# Patient Record
Sex: FEMALE | Race: WHITE | NOT HISPANIC OR LATINO | Employment: FULL TIME | URBAN - METROPOLITAN AREA
[De-identification: names, ages, dates, MRNs, and addresses within clinical notes are randomized per-mention and may not be internally consistent; named-entity substitution may affect disease eponyms.]

---

## 2017-09-06 ENCOUNTER — GENERIC CONVERSION - ENCOUNTER (OUTPATIENT)
Dept: OTHER | Facility: OTHER | Age: 47
End: 2017-09-06

## 2017-09-06 ENCOUNTER — GENERIC CONVERSION - ENCOUNTER (OUTPATIENT)
Dept: FAMILY MEDICINE CLINIC | Facility: CLINIC | Age: 47
End: 2017-09-06

## 2017-10-20 DIAGNOSIS — E03.9 HYPOTHYROIDISM: ICD-10-CM

## 2017-12-08 DIAGNOSIS — E03.9 HYPOTHYROIDISM: ICD-10-CM

## 2018-01-22 VITALS
SYSTOLIC BLOOD PRESSURE: 118 MMHG | BODY MASS INDEX: 34.55 KG/M2 | DIASTOLIC BLOOD PRESSURE: 70 MMHG | HEIGHT: 61 IN | TEMPERATURE: 98.8 F | WEIGHT: 183 LBS

## 2018-01-22 VITALS — RESPIRATION RATE: 16 BRPM | HEART RATE: 64 BPM

## 2018-06-11 DIAGNOSIS — E03.9 HYPOTHYROIDISM, UNSPECIFIED TYPE: Primary | ICD-10-CM

## 2018-06-11 RX ORDER — LEVOTHYROXINE SODIUM 0.03 MG/1
1 TABLET ORAL DAILY
COMMUNITY
Start: 2017-09-06 | End: 2018-06-11 | Stop reason: SDUPTHER

## 2018-06-12 PROBLEM — E03.9 HYPOTHYROID: Status: ACTIVE | Noted: 2017-09-06

## 2018-06-12 RX ORDER — LEVOTHYROXINE SODIUM 0.03 MG/1
25 TABLET ORAL DAILY
Qty: 30 TABLET | Refills: 0 | Status: SHIPPED | OUTPATIENT
Start: 2018-06-12 | End: 2018-07-16 | Stop reason: SDUPTHER

## 2018-07-16 DIAGNOSIS — E03.9 HYPOTHYROIDISM, UNSPECIFIED TYPE: ICD-10-CM

## 2018-07-16 RX ORDER — LEVOTHYROXINE SODIUM 0.03 MG/1
25 TABLET ORAL DAILY
Qty: 30 TABLET | Refills: 2 | Status: SHIPPED | OUTPATIENT
Start: 2018-07-16 | End: 2018-10-11 | Stop reason: SDUPTHER

## 2018-10-02 ENCOUNTER — TELEPHONE (OUTPATIENT)
Dept: FAMILY MEDICINE CLINIC | Facility: CLINIC | Age: 48
End: 2018-10-02

## 2018-10-02 NOTE — TELEPHONE ENCOUNTER
Pt called requesting a script for BW (including thyroid) in preparation for her appt next week to be mailed to her  Please advise  Thank you

## 2018-10-05 DIAGNOSIS — E03.9 HYPOTHYROIDISM, UNSPECIFIED TYPE: Primary | ICD-10-CM

## 2018-10-05 PROBLEM — N92.0 MENORRHAGIA WITH REGULAR CYCLE: Status: ACTIVE | Noted: 2017-08-09

## 2018-10-11 ENCOUNTER — OFFICE VISIT (OUTPATIENT)
Dept: FAMILY MEDICINE CLINIC | Facility: CLINIC | Age: 48
End: 2018-10-11
Payer: COMMERCIAL

## 2018-10-11 VITALS
HEIGHT: 62 IN | DIASTOLIC BLOOD PRESSURE: 80 MMHG | OXYGEN SATURATION: 94 % | BODY MASS INDEX: 34.78 KG/M2 | WEIGHT: 189 LBS | RESPIRATION RATE: 16 BRPM | SYSTOLIC BLOOD PRESSURE: 138 MMHG | HEART RATE: 72 BPM | TEMPERATURE: 98.9 F

## 2018-10-11 DIAGNOSIS — F41.9 ANXIETY: Primary | ICD-10-CM

## 2018-10-11 DIAGNOSIS — E03.9 HYPOTHYROIDISM, UNSPECIFIED TYPE: ICD-10-CM

## 2018-10-11 PROCEDURE — 99213 OFFICE O/P EST LOW 20 MIN: CPT | Performed by: FAMILY MEDICINE

## 2018-10-11 PROCEDURE — 1036F TOBACCO NON-USER: CPT | Performed by: FAMILY MEDICINE

## 2018-10-11 RX ORDER — LANOLIN ALCOHOL/MO/W.PET/CERES
CREAM (GRAM) TOPICAL
COMMUNITY
End: 2021-06-01 | Stop reason: ALTCHOICE

## 2018-10-11 RX ORDER — ESOMEPRAZOLE MAGNESIUM 20 MG/1
2 TABLET, DELAYED RELEASE ORAL
COMMUNITY
Start: 2017-01-01

## 2018-10-11 RX ORDER — ALPRAZOLAM 0.25 MG/1
0.25 TABLET ORAL 2 TIMES DAILY PRN
Qty: 30 TABLET | Refills: 0 | Status: SHIPPED | OUTPATIENT
Start: 2018-10-11 | End: 2018-11-12 | Stop reason: SDUPTHER

## 2018-10-11 RX ORDER — POLYETHYLENE GLYCOL 3350 17 G/17G
POWDER, FOR SOLUTION ORAL
COMMUNITY
Start: 2016-07-01

## 2018-10-11 NOTE — PROGRESS NOTES
Assessment/Plan:    Discussed treatment options with patient  Patient will trial present holland 0 25 mg 1 b i d  p r n , caution regarding drowsiness and addiction potential   Patient may continue melatonin q h s  p r n  Recommend psychological counseling  Recommend regular exercise walking 150 minutes per week  Return to the office p r n     Discussed full fasting labs in the near future  Diagnoses and all orders for this visit:    Anxiety  Comments:  Alprazolam 0 25 mg b i d  p r n   Orders:  -     ALPRAZolam (XANAX) 0 25 mg tablet; Take 1 tablet (0 25 mg total) by mouth 2 (two) times a day as needed for anxiety    Hypothyroidism, unspecified type    Other orders  -     Cetirizine HCl 10 MG CAPS;   -     Esomeprazole Magnesium 20 MG TBEC;   -     melatonin 3 mg;   -     polyethylene glycol (MIRALAX) 17 g packet; Subjective:      Patient ID: Harley Nieto is a 51 y o  female  Patient complains of problems with anxiety since her mother passed away few months ago  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  She admits to occasionally feeling depressed and sadness  Appetite remains good although patient is having difficulty sleeping at night  She has been taking melatonin with some improvement in her sleep  Patient continues to work without problems  No regular exercise recently  Patient recently had labs for TSH which were normal   She follows with her gynecologist regularly and is currently going through perimenopause  Anxiety   Presents for follow-up visit  Symptoms include depressed mood, excessive worry, insomnia, irritability, nervous/anxious behavior and restlessness  Patient reports no confusion, decreased concentration, hyperventilation, palpitations, panic or suicidal ideas  Symptoms occur most days  The quality of sleep is fair  Nighttime awakenings: one to two             The following portions of the patient's history were reviewed and updated as appropriate: allergies, current medications, past family history, past medical history, past social history, past surgical history and problem list     Review of Systems   Constitutional: Positive for irritability  Cardiovascular: Negative for palpitations  Psychiatric/Behavioral: Negative for confusion, decreased concentration and suicidal ideas  The patient is nervous/anxious and has insomnia  Objective:      /80   Pulse 72   Temp 98 9 °F (37 2 °C) (Tympanic)   Resp 16   Ht 5' 1 61" (1 565 m)   Wt 85 7 kg (189 lb)   SpO2 94%   BMI 35 00 kg/m²          Physical Exam   Constitutional: She is oriented to person, place, and time  She appears well-developed and well-nourished  No distress  HENT:   Head: Normocephalic  Mouth/Throat: Oropharynx is clear and moist    Eyes: Conjunctivae are normal  No scleral icterus  Neck: Neck supple  No thyromegaly present  Cardiovascular: Normal rate and regular rhythm  Pulmonary/Chest: Effort normal and breath sounds normal    Abdominal: Soft  There is no tenderness  Musculoskeletal: She exhibits no edema  Lymphadenopathy:     She has no cervical adenopathy  Neurological: She is alert and oriented to person, place, and time  Skin: Skin is warm and dry  Psychiatric: Her behavior is normal  Judgment and thought content normal    Patient appears anxious and tearful

## 2018-10-12 DIAGNOSIS — E03.9 HYPOTHYROIDISM, UNSPECIFIED TYPE: ICD-10-CM

## 2018-10-12 RX ORDER — LEVOTHYROXINE SODIUM 0.03 MG/1
TABLET ORAL
Qty: 30 TABLET | Refills: 2 | Status: SHIPPED | OUTPATIENT
Start: 2018-10-12 | End: 2019-04-11

## 2018-10-12 RX ORDER — LEVOTHYROXINE SODIUM 0.03 MG/1
25 TABLET ORAL DAILY
Qty: 30 TABLET | Refills: 5 | Status: SHIPPED | OUTPATIENT
Start: 2018-10-12 | End: 2019-04-11 | Stop reason: SDUPTHER

## 2018-11-12 DIAGNOSIS — F41.9 ANXIETY: ICD-10-CM

## 2018-11-12 RX ORDER — ALPRAZOLAM 0.25 MG/1
0.25 TABLET ORAL 2 TIMES DAILY PRN
Qty: 30 TABLET | Refills: 0 | Status: SHIPPED | OUTPATIENT
Start: 2018-11-12 | End: 2019-03-19 | Stop reason: SDUPTHER

## 2019-03-19 DIAGNOSIS — F41.9 ANXIETY: ICD-10-CM

## 2019-03-19 RX ORDER — ALPRAZOLAM 0.25 MG/1
0.25 TABLET ORAL 2 TIMES DAILY PRN
Qty: 30 TABLET | Refills: 0 | Status: SHIPPED | OUTPATIENT
Start: 2019-03-19 | End: 2019-07-29 | Stop reason: SDUPTHER

## 2019-04-11 ENCOUNTER — OFFICE VISIT (OUTPATIENT)
Dept: FAMILY MEDICINE CLINIC | Facility: CLINIC | Age: 49
End: 2019-04-11
Payer: COMMERCIAL

## 2019-04-11 VITALS
SYSTOLIC BLOOD PRESSURE: 144 MMHG | TEMPERATURE: 98 F | WEIGHT: 190 LBS | BODY MASS INDEX: 34.96 KG/M2 | HEART RATE: 78 BPM | OXYGEN SATURATION: 97 % | DIASTOLIC BLOOD PRESSURE: 110 MMHG | HEIGHT: 62 IN

## 2019-04-11 DIAGNOSIS — R03.0 ELEVATED BLOOD PRESSURE READING WITHOUT DIAGNOSIS OF HYPERTENSION: Primary | ICD-10-CM

## 2019-04-11 DIAGNOSIS — L30.9 ECZEMA, UNSPECIFIED TYPE: ICD-10-CM

## 2019-04-11 DIAGNOSIS — E03.9 HYPOTHYROIDISM, UNSPECIFIED TYPE: ICD-10-CM

## 2019-04-11 PROCEDURE — 99214 OFFICE O/P EST MOD 30 MIN: CPT | Performed by: NURSE PRACTITIONER

## 2019-04-11 PROCEDURE — 3008F BODY MASS INDEX DOCD: CPT | Performed by: NURSE PRACTITIONER

## 2019-04-11 PROCEDURE — 1036F TOBACCO NON-USER: CPT | Performed by: NURSE PRACTITIONER

## 2019-04-11 RX ORDER — LEVOTHYROXINE SODIUM 0.03 MG/1
25 TABLET ORAL DAILY
Qty: 30 TABLET | Refills: 5 | Status: SHIPPED | OUTPATIENT
Start: 2019-04-11 | End: 2019-07-11 | Stop reason: SDUPTHER

## 2019-04-30 ENCOUNTER — OFFICE VISIT (OUTPATIENT)
Dept: FAMILY MEDICINE CLINIC | Facility: CLINIC | Age: 49
End: 2019-04-30
Payer: COMMERCIAL

## 2019-04-30 VITALS
SYSTOLIC BLOOD PRESSURE: 138 MMHG | WEIGHT: 191 LBS | RESPIRATION RATE: 16 BRPM | HEIGHT: 62 IN | DIASTOLIC BLOOD PRESSURE: 80 MMHG | BODY MASS INDEX: 35.15 KG/M2 | HEART RATE: 72 BPM | TEMPERATURE: 98.6 F

## 2019-04-30 DIAGNOSIS — M77.11 EPICONDYLITIS, LATERAL, RIGHT: Primary | ICD-10-CM

## 2019-04-30 PROCEDURE — 3008F BODY MASS INDEX DOCD: CPT | Performed by: FAMILY MEDICINE

## 2019-04-30 PROCEDURE — 1036F TOBACCO NON-USER: CPT | Performed by: FAMILY MEDICINE

## 2019-04-30 PROCEDURE — 99213 OFFICE O/P EST LOW 20 MIN: CPT | Performed by: FAMILY MEDICINE

## 2019-04-30 RX ORDER — MELOXICAM 7.5 MG/1
7.5 TABLET ORAL DAILY
Qty: 30 TABLET | Refills: 0 | Status: SHIPPED | OUTPATIENT
Start: 2019-04-30 | End: 2020-03-09 | Stop reason: ALTCHOICE

## 2019-07-09 ENCOUNTER — TELEPHONE (OUTPATIENT)
Dept: FAMILY MEDICINE CLINIC | Facility: CLINIC | Age: 49
End: 2019-07-09

## 2019-07-09 DIAGNOSIS — E03.9 HYPOTHYROIDISM, UNSPECIFIED TYPE: Primary | ICD-10-CM

## 2019-07-10 ENCOUNTER — APPOINTMENT (OUTPATIENT)
Dept: LAB | Facility: CLINIC | Age: 49
End: 2019-07-10
Payer: COMMERCIAL

## 2019-07-10 DIAGNOSIS — E03.9 HYPOTHYROIDISM, UNSPECIFIED TYPE: ICD-10-CM

## 2019-07-10 LAB
T4 FREE SERPL-MCNC: 1.04 NG/DL (ref 0.76–1.46)
TSH SERPL DL<=0.05 MIU/L-ACNC: 4.37 UIU/ML (ref 0.36–3.74)

## 2019-07-10 PROCEDURE — 36415 COLL VENOUS BLD VENIPUNCTURE: CPT

## 2019-07-10 PROCEDURE — 84439 ASSAY OF FREE THYROXINE: CPT

## 2019-07-10 PROCEDURE — 84443 ASSAY THYROID STIM HORMONE: CPT

## 2019-07-10 NOTE — TELEPHONE ENCOUNTER
Left detailed message for Mary Connell that order was placed for TSH labs and to contact our office with any questions

## 2019-07-11 DIAGNOSIS — E03.9 HYPOTHYROIDISM, UNSPECIFIED TYPE: ICD-10-CM

## 2019-07-11 RX ORDER — LEVOTHYROXINE SODIUM 0.05 MG/1
50 TABLET ORAL DAILY
Qty: 30 TABLET | Refills: 2 | Status: SHIPPED | OUTPATIENT
Start: 2019-07-11 | End: 2019-09-29 | Stop reason: SDUPTHER

## 2019-07-29 DIAGNOSIS — F41.9 ANXIETY: ICD-10-CM

## 2019-07-29 RX ORDER — ALPRAZOLAM 0.25 MG/1
0.25 TABLET ORAL 2 TIMES DAILY PRN
Qty: 30 TABLET | Refills: 0 | Status: SHIPPED | OUTPATIENT
Start: 2019-07-29 | End: 2020-06-12 | Stop reason: SDUPTHER

## 2019-08-08 ENCOUNTER — TELEPHONE (OUTPATIENT)
Dept: FAMILY MEDICINE CLINIC | Facility: CLINIC | Age: 49
End: 2019-08-08

## 2019-08-08 NOTE — LETTER
August 8, 2019     [address] [id number]      Dear Ms Dalton:    In addition to helping you feel better when you are sick, we are interested in preventing illness and injury in the first place   In the spirit of maintaining your good health, our system indicates that you are due for the following:    Mammogram    Please call us so we can provide a prescription for your mammogram            Sincerely,     [facility]

## 2019-09-03 ENCOUNTER — TRANSCRIBE ORDERS (OUTPATIENT)
Dept: LAB | Facility: CLINIC | Age: 49
End: 2019-09-03

## 2019-09-03 ENCOUNTER — APPOINTMENT (OUTPATIENT)
Dept: LAB | Facility: CLINIC | Age: 49
End: 2019-09-03

## 2019-09-03 DIAGNOSIS — Z02.1 PRE-EMPLOYMENT EXAMINATION: ICD-10-CM

## 2019-09-03 DIAGNOSIS — Z02.1 PRE-EMPLOYMENT EXAMINATION: Primary | ICD-10-CM

## 2019-09-03 LAB — RUBV IGG SERPL IA-ACNC: >175 IU/ML

## 2019-09-03 PROCEDURE — 86765 RUBEOLA ANTIBODY: CPT

## 2019-09-03 PROCEDURE — 86762 RUBELLA ANTIBODY: CPT

## 2019-09-03 PROCEDURE — 86787 VARICELLA-ZOSTER ANTIBODY: CPT

## 2019-09-03 PROCEDURE — 86706 HEP B SURFACE ANTIBODY: CPT

## 2019-09-03 PROCEDURE — 86480 TB TEST CELL IMMUN MEASURE: CPT

## 2019-09-03 PROCEDURE — 86735 MUMPS ANTIBODY: CPT

## 2019-09-03 PROCEDURE — 36415 COLL VENOUS BLD VENIPUNCTURE: CPT

## 2019-09-04 LAB
GAMMA INTERFERON BACKGROUND BLD IA-ACNC: 0.09 IU/ML
HBV SURFACE AB SER-ACNC: 4.56 MIU/ML
M TB IFN-G BLD-IMP: NEGATIVE
M TB IFN-G CD4+ BCKGRND COR BLD-ACNC: -0.04 IU/ML
M TB IFN-G CD4+ BCKGRND COR BLD-ACNC: 0.01 IU/ML
MITOGEN IGNF BCKGRD COR BLD-ACNC: >10 IU/ML

## 2019-09-05 LAB
MEV IGG SER QL: NORMAL
MUV IGG SER QL: NORMAL
VZV IGG SER IA-ACNC: NORMAL

## 2019-09-29 DIAGNOSIS — E03.9 HYPOTHYROIDISM, UNSPECIFIED TYPE: ICD-10-CM

## 2019-09-29 RX ORDER — LEVOTHYROXINE SODIUM 0.05 MG/1
TABLET ORAL
Qty: 30 TABLET | Refills: 2 | Status: SHIPPED | OUTPATIENT
Start: 2019-09-29 | End: 2019-12-23 | Stop reason: SDUPTHER

## 2019-11-02 ENCOUNTER — APPOINTMENT (OUTPATIENT)
Dept: LAB | Age: 49
End: 2019-11-02

## 2019-11-02 ENCOUNTER — TRANSCRIBE ORDERS (OUTPATIENT)
Dept: ADMINISTRATIVE | Facility: HOSPITAL | Age: 49
End: 2019-11-02

## 2019-11-02 DIAGNOSIS — Z01.84 IMMUNITY STATUS TESTING: ICD-10-CM

## 2019-11-02 DIAGNOSIS — Z01.84 IMMUNITY STATUS TESTING: Primary | ICD-10-CM

## 2019-11-02 LAB — HBV SURFACE AB SER-ACNC: <3.1 MIU/ML

## 2019-11-02 PROCEDURE — 86706 HEP B SURFACE ANTIBODY: CPT

## 2019-11-02 PROCEDURE — 36415 COLL VENOUS BLD VENIPUNCTURE: CPT

## 2019-12-23 DIAGNOSIS — E03.9 HYPOTHYROIDISM, UNSPECIFIED TYPE: ICD-10-CM

## 2019-12-23 RX ORDER — LEVOTHYROXINE SODIUM 0.05 MG/1
TABLET ORAL
Qty: 30 TABLET | Refills: 0 | Status: SHIPPED | OUTPATIENT
Start: 2019-12-23 | End: 2020-01-20

## 2020-01-20 DIAGNOSIS — E03.9 HYPOTHYROIDISM, UNSPECIFIED TYPE: ICD-10-CM

## 2020-01-20 RX ORDER — LEVOTHYROXINE SODIUM 0.05 MG/1
TABLET ORAL
Qty: 30 TABLET | Refills: 0 | Status: SHIPPED | OUTPATIENT
Start: 2020-01-20 | End: 2020-01-24 | Stop reason: SDUPTHER

## 2020-01-24 DIAGNOSIS — E03.9 HYPOTHYROIDISM, UNSPECIFIED TYPE: ICD-10-CM

## 2020-01-24 RX ORDER — LEVOTHYROXINE SODIUM 0.05 MG/1
50 TABLET ORAL DAILY
Qty: 90 TABLET | Refills: 0 | Status: SHIPPED | OUTPATIENT
Start: 2020-01-24 | End: 2020-04-13 | Stop reason: SDUPTHER

## 2020-02-04 ENCOUNTER — TELEPHONE (OUTPATIENT)
Dept: FAMILY MEDICINE CLINIC | Facility: CLINIC | Age: 50
End: 2020-02-04

## 2020-02-04 DIAGNOSIS — E03.9 HYPOTHYROIDISM, UNSPECIFIED TYPE: Primary | ICD-10-CM

## 2020-02-08 ENCOUNTER — APPOINTMENT (OUTPATIENT)
Dept: LAB | Age: 50
End: 2020-02-08
Payer: COMMERCIAL

## 2020-02-08 DIAGNOSIS — E03.9 HYPOTHYROIDISM, UNSPECIFIED TYPE: ICD-10-CM

## 2020-02-08 LAB — TSH SERPL DL<=0.05 MIU/L-ACNC: 2.18 UIU/ML (ref 0.36–3.74)

## 2020-02-08 PROCEDURE — 84443 ASSAY THYROID STIM HORMONE: CPT

## 2020-02-08 PROCEDURE — 36415 COLL VENOUS BLD VENIPUNCTURE: CPT

## 2020-03-09 ENCOUNTER — OFFICE VISIT (OUTPATIENT)
Dept: FAMILY MEDICINE CLINIC | Facility: CLINIC | Age: 50
End: 2020-03-09
Payer: COMMERCIAL

## 2020-03-09 VITALS
DIASTOLIC BLOOD PRESSURE: 74 MMHG | TEMPERATURE: 99.4 F | HEIGHT: 62 IN | RESPIRATION RATE: 16 BRPM | HEART RATE: 80 BPM | WEIGHT: 189 LBS | OXYGEN SATURATION: 97 % | BODY MASS INDEX: 34.78 KG/M2 | SYSTOLIC BLOOD PRESSURE: 120 MMHG

## 2020-03-09 DIAGNOSIS — J20.9 ACUTE BRONCHITIS, UNSPECIFIED ORGANISM: Primary | ICD-10-CM

## 2020-03-09 DIAGNOSIS — L30.9 ECZEMA, UNSPECIFIED TYPE: ICD-10-CM

## 2020-03-09 DIAGNOSIS — Z12.11 ENCOUNTER FOR SCREENING COLONOSCOPY: Primary | ICD-10-CM

## 2020-03-09 PROCEDURE — 3008F BODY MASS INDEX DOCD: CPT | Performed by: FAMILY MEDICINE

## 2020-03-09 PROCEDURE — 99213 OFFICE O/P EST LOW 20 MIN: CPT | Performed by: FAMILY MEDICINE

## 2020-03-09 PROCEDURE — 1036F TOBACCO NON-USER: CPT | Performed by: FAMILY MEDICINE

## 2020-03-09 RX ORDER — AZITHROMYCIN 250 MG/1
TABLET, FILM COATED ORAL
Qty: 6 TABLET | Refills: 0 | Status: SHIPPED | OUTPATIENT
Start: 2020-03-09 | End: 2020-03-13

## 2020-03-09 NOTE — PROGRESS NOTES
Assessment/Plan:  Patient was started on a Z-Melo and may continue Robitussin DM or Mucinex DM p leida Jacome She is encouraged to drink plenty of fluids and rest   Remain out of work today and tomorrow  Return to the office in 1 week or call sooner reg Jacome Diagnoses and all orders for this visit:    Acute bronchitis, unspecified organism  -     azithromycin (ZITHROMAX) 250 mg tablet; Take 2 tablets today then 1 tablet daily x 4 days    Eczema, unspecified type  -     triamcinolone (KENALOG) 0 1 % ointment; Apply topically 2 (two) times a day  -     Mammo screening bilateral w 3d & cad; Future          Subjective:      Patient ID: Joey Small is a 47 y o  female  Patient started 3 days ago with cold symptoms  She complains of nasal congestion, headache and cough  She denies fever or wheezing  She has treated this with Tussin DM and Advil with some relief  URI    This is a new problem  The current episode started in the past 7 days  The problem has been gradually worsening  There has been no fever  Associated symptoms include congestion, coughing, headaches, nausea, rhinorrhea and sinus pain  Pertinent negatives include no diarrhea, ear pain, plugged ear sensation, sneezing, sore throat, vomiting or wheezing  She has tried NSAIDs and increased fluids (tussin DM) for the symptoms  The treatment provided mild relief  The following portions of the patient's history were reviewed and updated as appropriate: allergies, current medications, past family history, past medical history, past social history, past surgical history and problem list     Review of Systems   HENT: Positive for congestion, rhinorrhea and sinus pain  Negative for ear pain, sneezing and sore throat  Respiratory: Positive for cough  Negative for wheezing  Gastrointestinal: Positive for nausea  Negative for diarrhea and vomiting  Neurological: Positive for headaches           Objective:      /74 (BP Location: Left arm, Patient Position: Sitting, Cuff Size: Large)   Pulse 80   Temp 99 4 °F (37 4 °C) (Tympanic)   Resp 16   Ht 5' 2" (1 575 m)   Wt 85 7 kg (189 lb)   SpO2 97%   BMI 34 57 kg/m²          Physical Exam   Constitutional: She is oriented to person, place, and time  She appears well-developed and well-nourished  No distress  HENT:   Head: Normocephalic  Right Ear: External ear normal    Left Ear: External ear normal    Turbinates swelling with mucoid drainage  Throat postnasal drainage and injected  Eyes: Conjunctivae are normal  No scleral icterus  Neck: Neck supple  Cardiovascular: Normal rate and regular rhythm  Pulmonary/Chest: Effort normal and breath sounds normal  No respiratory distress  She has no wheezes  Abdominal: Soft  There is no tenderness  Musculoskeletal: She exhibits no edema  Lymphadenopathy:     She has no cervical adenopathy  Neurological: She is alert and oriented to person, place, and time  Skin: Skin is warm and dry  Psychiatric: She has a normal mood and affect  BMI Counseling: Body mass index is 34 57 kg/m²  The BMI is above normal  Nutrition recommendations include decreasing overall calorie intake, 3-5 servings of fruits/vegetables daily, reducing fast food intake, consuming healthier snacks, decreasing soda and/or juice intake, moderation in carbohydrate intake and reducing intake of saturated fat and trans fat  Exercise recommendations include moderate aerobic physical activity for 150 minutes/week

## 2020-04-13 DIAGNOSIS — E03.9 HYPOTHYROIDISM, UNSPECIFIED TYPE: ICD-10-CM

## 2020-04-13 DIAGNOSIS — L30.9 ECZEMA, UNSPECIFIED TYPE: ICD-10-CM

## 2020-04-13 RX ORDER — LEVOTHYROXINE SODIUM 0.05 MG/1
50 TABLET ORAL DAILY
Qty: 90 TABLET | Refills: 3 | Status: SHIPPED | OUTPATIENT
Start: 2020-04-13 | End: 2021-04-22 | Stop reason: SDUPTHER

## 2020-06-12 DIAGNOSIS — F41.9 ANXIETY: ICD-10-CM

## 2020-06-12 RX ORDER — ALPRAZOLAM 0.25 MG/1
0.25 TABLET ORAL 2 TIMES DAILY PRN
Qty: 30 TABLET | Refills: 0 | Status: SHIPPED | OUTPATIENT
Start: 2020-06-12 | End: 2020-08-22

## 2020-07-13 ENCOUNTER — APPOINTMENT (OUTPATIENT)
Dept: LAB | Age: 50
End: 2020-07-13
Payer: COMMERCIAL

## 2020-07-13 ENCOUNTER — TRANSCRIBE ORDERS (OUTPATIENT)
Dept: URGENT CARE | Age: 50
End: 2020-07-13

## 2020-07-13 DIAGNOSIS — Z01.84 IMMUNITY STATUS TESTING: ICD-10-CM

## 2020-07-13 DIAGNOSIS — Z01.84 IMMUNITY STATUS TESTING: Primary | ICD-10-CM

## 2020-07-13 LAB — HBV SURFACE AB SER-ACNC: 132.19 MIU/ML

## 2020-07-13 PROCEDURE — 86706 HEP B SURFACE ANTIBODY: CPT

## 2020-07-13 PROCEDURE — 36415 COLL VENOUS BLD VENIPUNCTURE: CPT

## 2020-08-20 DIAGNOSIS — F41.9 ANXIETY: ICD-10-CM

## 2020-08-21 DIAGNOSIS — F41.9 ANXIETY: ICD-10-CM

## 2020-08-22 RX ORDER — ALPRAZOLAM 0.25 MG/1
0.25 TABLET ORAL 2 TIMES DAILY PRN
Qty: 30 TABLET | Refills: 0 | Status: SHIPPED | OUTPATIENT
Start: 2020-08-22 | End: 2020-11-27 | Stop reason: SDUPTHER

## 2020-08-26 RX ORDER — ALPRAZOLAM 0.25 MG/1
0.25 TABLET ORAL 2 TIMES DAILY PRN
Qty: 30 TABLET | Refills: 0 | OUTPATIENT
Start: 2020-08-26

## 2020-08-26 NOTE — TELEPHONE ENCOUNTER
Can you please deny this request as it is still pending in our in-basket   It was already approved on 8/22

## 2020-08-27 ENCOUNTER — ANESTHESIA EVENT (OUTPATIENT)
Dept: GASTROENTEROLOGY | Facility: HOSPITAL | Age: 50
End: 2020-08-27

## 2020-08-28 ENCOUNTER — ANESTHESIA (OUTPATIENT)
Dept: GASTROENTEROLOGY | Facility: HOSPITAL | Age: 50
End: 2020-08-28

## 2020-08-28 ENCOUNTER — HOSPITAL ENCOUNTER (OUTPATIENT)
Dept: GASTROENTEROLOGY | Facility: HOSPITAL | Age: 50
Setting detail: OUTPATIENT SURGERY
Discharge: HOME | End: 2020-08-28
Attending: INTERNAL MEDICINE | Admitting: INTERNAL MEDICINE
Payer: COMMERCIAL

## 2020-08-28 VITALS
SYSTOLIC BLOOD PRESSURE: 143 MMHG | TEMPERATURE: 97.9 F | RESPIRATION RATE: 16 BRPM | HEART RATE: 64 BPM | OXYGEN SATURATION: 100 % | DIASTOLIC BLOOD PRESSURE: 83 MMHG

## 2020-08-28 DIAGNOSIS — Z12.11 ENCOUNTER FOR SCREENING FOR MALIGNANT NEOPLASM OF COLON: ICD-10-CM

## 2020-08-28 DIAGNOSIS — K59.00 CONSTIPATION, UNSPECIFIED: ICD-10-CM

## 2020-08-28 DIAGNOSIS — Z83.71 FAMILY HISTORY OF COLONIC POLYPS: ICD-10-CM

## 2020-08-28 LAB
EXT PREGNANCY TEST URINE: NEGATIVE
EXT. CONTROL: NORMAL

## 2020-08-28 PROCEDURE — 81025 URINE PREGNANCY TEST: CPT | Performed by: ANESTHESIOLOGY

## 2020-08-28 PROCEDURE — 45378 DIAGNOSTIC COLONOSCOPY: CPT | Performed by: INTERNAL MEDICINE

## 2020-08-28 RX ORDER — PROPOFOL 10 MG/ML
INJECTION, EMULSION INTRAVENOUS AS NEEDED
Status: DISCONTINUED | OUTPATIENT
Start: 2020-08-28 | End: 2020-08-28

## 2020-08-28 RX ORDER — SODIUM CHLORIDE 9 MG/ML
125 INJECTION, SOLUTION INTRAVENOUS CONTINUOUS
Status: DISCONTINUED | OUTPATIENT
Start: 2020-08-28 | End: 2020-09-01 | Stop reason: HOSPADM

## 2020-08-28 RX ADMIN — PROPOFOL 120 MG: 10 INJECTION, EMULSION INTRAVENOUS at 08:35

## 2020-08-28 RX ADMIN — PROPOFOL 50 MG: 10 INJECTION, EMULSION INTRAVENOUS at 08:41

## 2020-08-28 RX ADMIN — SODIUM CHLORIDE 125 ML/HR: 0.9 INJECTION, SOLUTION INTRAVENOUS at 08:24

## 2020-08-28 NOTE — ANESTHESIA PREPROCEDURE EVALUATION
Procedure:  COLONOSCOPY    Relevant Problems   ENDO   (+) Hypothyroid      GI/HEPATIC   (+) GERD without esophagitis      PULMONARY   (+) Asthma        Physical Exam    Airway    Mallampati score: II  TM Distance: >3 FB  Neck ROM: full     Dental   No notable dental hx     Cardiovascular  Cardiovascular exam normal    Pulmonary  Pulmonary exam normal     Other Findings        Anesthesia Plan  ASA Score- 2     Anesthesia Type- general with ASA Monitors  Additional Monitors:   Airway Plan:           Plan Factors-Exercise tolerance (METS): >4 METS  Chart reviewed  Patient is not a current smoker  Patient not instructed to abstain from smoking on day of procedure  Patient did not smoke on day of surgery  Induction- intravenous  Postoperative Plan-     Informed Consent- Anesthetic plan and risks discussed with patient

## 2020-08-28 NOTE — DISCHARGE INSTRUCTIONS
[facility] Discharge instructions    1  Rest today; avoid strenuous activity  It is common to have retained air in the intestinal tract following an endoscopy  Passing the air (flatus, belching) will assist in making abdominal bloating/cramping improve  2  No alcoholic beverages or driving for 12 hours if you were given sedation  3  Resume your usual diet and medications unless you were asked to change it prior to leaving the GI lab  Begin with small meal or snack first     4  Call our office at [phone] if you have any problems, concerns or questions  You may use this same number to schedule a follow up appointment if that has been suggested  Your Colonoscopy: was completed  Findings included the following:     The entire colon appears normal   Brief visualization of the ileum also was normal  Bowel preparation is very good  Hemorrhoids identified    Recommendation  Given family history of colon polyps at a reasonably young age, consider repeat screening colonoscopy 5 years

## 2020-11-27 DIAGNOSIS — F41.9 ANXIETY: ICD-10-CM

## 2020-11-27 RX ORDER — ALPRAZOLAM 0.25 MG/1
0.25 TABLET ORAL 2 TIMES DAILY PRN
Qty: 30 TABLET | Refills: 0 | Status: SHIPPED | OUTPATIENT
Start: 2020-11-27 | End: 2021-01-22 | Stop reason: SDUPTHER

## 2021-01-02 ENCOUNTER — IMMUNIZATIONS (OUTPATIENT)
Dept: FAMILY MEDICINE CLINIC | Facility: HOSPITAL | Age: 51
End: 2021-01-02

## 2021-01-02 DIAGNOSIS — Z23 ENCOUNTER FOR IMMUNIZATION: ICD-10-CM

## 2021-01-02 PROCEDURE — 0011A SARS-COV-2 / COVID-19 MRNA VACCINE (MODERNA) 100 MCG: CPT

## 2021-01-02 PROCEDURE — 91301 SARS-COV-2 / COVID-19 MRNA VACCINE (MODERNA) 100 MCG: CPT

## 2021-01-22 DIAGNOSIS — F41.9 ANXIETY: ICD-10-CM

## 2021-01-22 RX ORDER — ALPRAZOLAM 0.25 MG/1
0.25 TABLET ORAL 2 TIMES DAILY PRN
Qty: 30 TABLET | Refills: 0 | Status: SHIPPED | OUTPATIENT
Start: 2021-01-22 | End: 2021-05-04 | Stop reason: SDUPTHER

## 2021-01-29 ENCOUNTER — IMMUNIZATIONS (OUTPATIENT)
Dept: FAMILY MEDICINE CLINIC | Facility: HOSPITAL | Age: 51
End: 2021-01-29

## 2021-01-29 DIAGNOSIS — Z23 ENCOUNTER FOR IMMUNIZATION: Primary | ICD-10-CM

## 2021-01-29 PROCEDURE — 0012A SARS-COV-2 / COVID-19 MRNA VACCINE (MODERNA) 100 MCG: CPT

## 2021-01-29 PROCEDURE — 91301 SARS-COV-2 / COVID-19 MRNA VACCINE (MODERNA) 100 MCG: CPT

## 2021-04-10 ENCOUNTER — HOSPITAL ENCOUNTER (OUTPATIENT)
Dept: RADIOLOGY | Age: 51
Discharge: HOME | End: 2021-04-10
Payer: COMMERCIAL

## 2021-04-10 VITALS — WEIGHT: 185 LBS | HEIGHT: 62 IN | BODY MASS INDEX: 34.04 KG/M2

## 2021-04-10 DIAGNOSIS — Z12.31 ENCOUNTER FOR SCREENING MAMMOGRAM FOR MALIGNANT NEOPLASM OF BREAST: ICD-10-CM

## 2021-04-10 DIAGNOSIS — L30.9 ECZEMA, UNSPECIFIED TYPE: ICD-10-CM

## 2021-04-10 PROCEDURE — 77063 BREAST TOMOSYNTHESIS BI: CPT

## 2021-04-10 PROCEDURE — 77067 SCR MAMMO BI INCL CAD: CPT

## 2021-04-22 DIAGNOSIS — L30.9 ECZEMA, UNSPECIFIED TYPE: ICD-10-CM

## 2021-04-22 DIAGNOSIS — E03.9 HYPOTHYROIDISM, UNSPECIFIED TYPE: ICD-10-CM

## 2021-04-26 RX ORDER — LEVOTHYROXINE SODIUM 0.05 MG/1
50 TABLET ORAL DAILY
Qty: 90 TABLET | Refills: 0 | Status: SHIPPED | OUTPATIENT
Start: 2021-04-26 | End: 2021-07-22 | Stop reason: SDUPTHER

## 2021-04-26 NOTE — TELEPHONE ENCOUNTER
Prescriptions refilled  Patient needs to schedule follow-up appointment as she was last seen her over a year ago

## 2021-05-04 ENCOUNTER — OFFICE VISIT (OUTPATIENT)
Dept: FAMILY MEDICINE CLINIC | Facility: CLINIC | Age: 51
End: 2021-05-04
Payer: COMMERCIAL

## 2021-05-04 VITALS
HEIGHT: 62 IN | SYSTOLIC BLOOD PRESSURE: 132 MMHG | BODY MASS INDEX: 35.77 KG/M2 | TEMPERATURE: 97.4 F | HEART RATE: 68 BPM | DIASTOLIC BLOOD PRESSURE: 86 MMHG | OXYGEN SATURATION: 98 % | RESPIRATION RATE: 16 BRPM | WEIGHT: 194.4 LBS

## 2021-05-04 DIAGNOSIS — E03.9 HYPOTHYROIDISM, UNSPECIFIED TYPE: ICD-10-CM

## 2021-05-04 DIAGNOSIS — E66.9 OBESITY (BMI 35.0-39.9 WITHOUT COMORBIDITY): ICD-10-CM

## 2021-05-04 DIAGNOSIS — F41.9 ANXIETY: ICD-10-CM

## 2021-05-04 DIAGNOSIS — K21.9 GERD WITHOUT ESOPHAGITIS: ICD-10-CM

## 2021-05-04 DIAGNOSIS — Z00.00 ANNUAL PHYSICAL EXAM: Primary | ICD-10-CM

## 2021-05-04 LAB
25(OH)D3 SERPL-MCNC: 22.7 NG/ML (ref 30–100)
ALBUMIN SERPL BCP-MCNC: 3.5 G/DL (ref 3.5–5)
ALP SERPL-CCNC: 104 U/L (ref 46–116)
ALT SERPL W P-5'-P-CCNC: 30 U/L (ref 12–78)
ANION GAP SERPL CALCULATED.3IONS-SCNC: 2 MMOL/L (ref 4–13)
AST SERPL W P-5'-P-CCNC: 23 U/L (ref 5–45)
BILIRUB SERPL-MCNC: 0.66 MG/DL (ref 0.2–1)
BILIRUB UR QL STRIP: NEGATIVE
BUN SERPL-MCNC: 12 MG/DL (ref 5–25)
CALCIUM SERPL-MCNC: 9.6 MG/DL (ref 8.3–10.1)
CHLORIDE SERPL-SCNC: 107 MMOL/L (ref 100–108)
CHOLEST SERPL-MCNC: 232 MG/DL (ref 50–200)
CLARITY UR: CLEAR
CO2 SERPL-SCNC: 29 MMOL/L (ref 21–32)
COLOR UR: YELLOW
CREAT SERPL-MCNC: 1 MG/DL (ref 0.6–1.3)
ERYTHROCYTE [DISTWIDTH] IN BLOOD BY AUTOMATED COUNT: 14.2 % (ref 11.6–15.1)
EST. AVERAGE GLUCOSE BLD GHB EST-MCNC: 140 MG/DL
GFR SERPL CREATININE-BSD FRML MDRD: 65 ML/MIN/1.73SQ M
GLUCOSE P FAST SERPL-MCNC: 119 MG/DL (ref 65–99)
GLUCOSE UR STRIP-MCNC: NEGATIVE MG/DL
HBA1C MFR BLD: 6.5 %
HCT VFR BLD AUTO: 39.1 % (ref 34.8–46.1)
HDLC SERPL-MCNC: 56 MG/DL
HGB BLD-MCNC: 12.3 G/DL (ref 11.5–15.4)
HGB UR QL STRIP.AUTO: NEGATIVE
KETONES UR STRIP-MCNC: NEGATIVE MG/DL
LDLC SERPL CALC-MCNC: 133 MG/DL (ref 0–100)
LEUKOCYTE ESTERASE UR QL STRIP: NEGATIVE
MCH RBC QN AUTO: 26.9 PG (ref 26.8–34.3)
MCHC RBC AUTO-ENTMCNC: 31.5 G/DL (ref 31.4–37.4)
MCV RBC AUTO: 85 FL (ref 82–98)
NITRITE UR QL STRIP: NEGATIVE
NONHDLC SERPL-MCNC: 176 MG/DL
PH UR STRIP.AUTO: 7 [PH]
PLATELET # BLD AUTO: 332 THOUSANDS/UL (ref 149–390)
PMV BLD AUTO: 10.1 FL (ref 8.9–12.7)
POTASSIUM SERPL-SCNC: 4.3 MMOL/L (ref 3.5–5.3)
PROT SERPL-MCNC: 7.5 G/DL (ref 6.4–8.2)
PROT UR STRIP-MCNC: NEGATIVE MG/DL
RBC # BLD AUTO: 4.58 MILLION/UL (ref 3.81–5.12)
SODIUM SERPL-SCNC: 138 MMOL/L (ref 136–145)
SP GR UR STRIP.AUTO: 1.01 (ref 1–1.03)
T4 FREE SERPL-MCNC: 1.13 NG/DL (ref 0.76–1.46)
TRIGL SERPL-MCNC: 213 MG/DL
TSH SERPL DL<=0.05 MIU/L-ACNC: 3.85 UIU/ML (ref 0.36–3.74)
UROBILINOGEN UR QL STRIP.AUTO: 0.2 E.U./DL
WBC # BLD AUTO: 6.88 THOUSAND/UL (ref 4.31–10.16)

## 2021-05-04 PROCEDURE — 84443 ASSAY THYROID STIM HORMONE: CPT | Performed by: FAMILY MEDICINE

## 2021-05-04 PROCEDURE — 80053 COMPREHEN METABOLIC PANEL: CPT | Performed by: FAMILY MEDICINE

## 2021-05-04 PROCEDURE — 84439 ASSAY OF FREE THYROXINE: CPT | Performed by: FAMILY MEDICINE

## 2021-05-04 PROCEDURE — 83036 HEMOGLOBIN GLYCOSYLATED A1C: CPT | Performed by: FAMILY MEDICINE

## 2021-05-04 PROCEDURE — 85027 COMPLETE CBC AUTOMATED: CPT | Performed by: FAMILY MEDICINE

## 2021-05-04 PROCEDURE — 36415 COLL VENOUS BLD VENIPUNCTURE: CPT | Performed by: FAMILY MEDICINE

## 2021-05-04 PROCEDURE — 81003 URINALYSIS AUTO W/O SCOPE: CPT | Performed by: FAMILY MEDICINE

## 2021-05-04 PROCEDURE — 80061 LIPID PANEL: CPT | Performed by: FAMILY MEDICINE

## 2021-05-04 PROCEDURE — 82306 VITAMIN D 25 HYDROXY: CPT | Performed by: FAMILY MEDICINE

## 2021-05-04 PROCEDURE — 99396 PREV VISIT EST AGE 40-64: CPT | Performed by: FAMILY MEDICINE

## 2021-05-04 RX ORDER — ALPRAZOLAM 0.25 MG/1
0.25 TABLET ORAL 2 TIMES DAILY PRN
Qty: 30 TABLET | Refills: 0 | Status: SHIPPED | OUTPATIENT
Start: 2021-05-04 | End: 2021-07-20 | Stop reason: SDUPTHER

## 2021-05-04 NOTE — PROGRESS NOTES
Assessment/Plan:     Fasting labs drawn as below  Patient to continue present treatment  Patient instructed follow a low-fat, low-salt and a low-carbohydrate diet and get regular aerobic exercise walking 150 minutes per week  Weight loss encouraged  Recommend patient schedule gyn exam   Return the office in 1 year  Diagnoses and all orders for this visit:    Annual physical exam  -     CBC and Platelet  -     Comprehensive metabolic panel  -     HEMOGLOBIN A1C W/ EAG ESTIMATION  -     Lipid panel  -     UA w Reflex to Microscopic w Reflex to Culture - Clinic Collect  -     Vitamin D 25 hydroxy    Hypothyroidism, unspecified type  -     TSH, 3rd generation with Free T4 reflex  -     Vitamin D 25 hydroxy    GERD without esophagitis    Obesity (BMI 35 0-39 9 without comorbidity)  -     Vitamin D 25 hydroxy    Anxiety  Comments:  Alprazolam 0 25 mg b i d  p r n   Orders:  -     ALPRAZolam (XANAX) 0 25 mg tablet; Take 1 tablet (0 25 mg total) by mouth 2 (two) times a day as needed for anxiety    Other orders  -     Multiple Vitamins-Minerals (MULTIVITAMIN WOMENS 50+ ADV PO); Take 1 tablet by mouth daily          Subjective:      Patient ID: Tammy Lafleur is a 45 y o  female  Patient is here for annual physical exam for her health insurance plan including lipid panel and hemoglobin A1c and also follow-up of chronic conditions  Patient has been feeling well overall  She admits to menopause symptoms  Patient last saw her gynecologist 2 years ago and encouraged to schedule gyn appointment  Patient is up-to-date on colonoscopy and mammogram   No regular exercise program although patient walks [phone] steps daily  Patient did receive COVID vaccine times 2 Moderna  Thyroid Problem  Presents for follow-up visit  Symptoms include anxiety, diaphoresis, dry skin and weight gain   Patient reports no cold intolerance, constipation, depressed mood, diarrhea, fatigue, hair loss, heat intolerance, hoarse voice, leg swelling, menstrual problem, palpitations, tremors, visual change or weight loss  The symptoms have been stable  Heartburn  She complains of dysphagia and heartburn  She reports no abdominal pain, no belching, no chest pain, no choking, no coughing, no early satiety, no globus sensation, no hoarse voice or no nausea  The problem occurs occasionally  The problem has been gradually improving  The heartburn does not wake her from sleep  The heartburn does not limit her activity  The symptoms are aggravated by certain foods and lying down  Pertinent negatives include no anemia, fatigue, melena, muscle weakness, orthopnea or weight loss  Risk factors include obesity and hiatal hernia  She has tried a PPI for the symptoms  The treatment provided significant relief  Past procedures include an EGD and a UGI  Anxiety  Presents for follow-up visit  Symptoms include excessive worry, irritability, nervous/anxious behavior and restlessness  Patient reports no chest pain, confusion, decreased concentration, depressed mood, hyperventilation, insomnia, nausea, palpitations, panic, shortness of breath or suicidal ideas  Symptoms occur occasionally  The quality of sleep is fair  Nighttime awakenings: one to two  The following portions of the patient's history were reviewed and updated as appropriate: allergies, current medications, past family history, past medical history, past social history, past surgical history and problem list     Review of Systems   Constitutional: Positive for diaphoresis, irritability and weight gain  Negative for fatigue and weight loss  HENT: Negative for hoarse voice  Respiratory: Negative for cough, choking and shortness of breath  Cardiovascular: Negative for chest pain and palpitations  Gastrointestinal: Positive for dysphagia and heartburn  Negative for abdominal pain, constipation, diarrhea, melena and nausea     Endocrine: Negative for cold intolerance and heat intolerance  Genitourinary: Negative for menstrual problem  Musculoskeletal: Negative for muscle weakness  Neurological: Negative for tremors  Psychiatric/Behavioral: Negative for confusion, decreased concentration and suicidal ideas  The patient is nervous/anxious  The patient does not have insomnia  Objective:      /86   Pulse 68   Temp (!) 97 4 °F (36 3 °C) (Temporal)   Resp 16   Ht 5' 2" (1 575 m)   Wt 88 2 kg (194 lb 6 4 oz)   LMP  (LMP Unknown)   SpO2 98%   BMI 35 56 kg/m²          Physical Exam  Constitutional:       General: She is not in acute distress  Appearance: Normal appearance  She is obese  HENT:      Head: Normocephalic  Mouth/Throat:      Mouth: Mucous membranes are moist    Eyes:      General: No scleral icterus  Conjunctiva/sclera: Conjunctivae normal    Neck:      Musculoskeletal: Neck supple  Vascular: No carotid bruit  Cardiovascular:      Rate and Rhythm: Normal rate and regular rhythm  Pulmonary:      Effort: Pulmonary effort is normal       Breath sounds: Normal breath sounds  Abdominal:      Palpations: Abdomen is soft  Tenderness: There is no abdominal tenderness  Musculoskeletal:      Right lower leg: No edema  Left lower leg: No edema  Lymphadenopathy:      Cervical: No cervical adenopathy  Skin:     General: Skin is warm and dry  Neurological:      General: No focal deficit present  Mental Status: She is alert and oriented to person, place, and time  Psychiatric:         Mood and Affect: Mood normal          Behavior: Behavior normal          Thought Content: Thought content normal          Judgment: Judgment normal          BMI Counseling: Body mass index is 35 56 kg/m²   The BMI is above normal  Nutrition recommendations include reducing portion sizes, decreasing overall calorie intake, 3-5 servings of fruits/vegetables daily, reducing fast food intake, consuming healthier snacks, decreasing soda and/or juice intake, moderation in carbohydrate intake and reducing intake of saturated fat and trans fat  Exercise recommendations include moderate aerobic physical activity for 150 minutes/week

## 2021-05-05 PROBLEM — E55.9 VITAMIN D DEFICIENCY: Status: ACTIVE | Noted: 2021-05-05

## 2021-05-05 PROBLEM — R73.02 GLUCOSE INTOLERANCE (IMPAIRED GLUCOSE TOLERANCE): Status: ACTIVE | Noted: 2021-05-05

## 2021-06-01 ENCOUNTER — OFFICE VISIT (OUTPATIENT)
Dept: FAMILY MEDICINE CLINIC | Facility: CLINIC | Age: 51
End: 2021-06-01
Payer: COMMERCIAL

## 2021-06-01 VITALS
HEART RATE: 84 BPM | HEIGHT: 61 IN | RESPIRATION RATE: 16 BRPM | DIASTOLIC BLOOD PRESSURE: 82 MMHG | SYSTOLIC BLOOD PRESSURE: 124 MMHG | WEIGHT: 192.2 LBS | OXYGEN SATURATION: 99 % | TEMPERATURE: 97.6 F | BODY MASS INDEX: 36.29 KG/M2

## 2021-06-01 DIAGNOSIS — S16.1XXA STRAIN OF NECK MUSCLE, INITIAL ENCOUNTER: ICD-10-CM

## 2021-06-01 DIAGNOSIS — M54.2 NECK PAIN, ACUTE: Primary | ICD-10-CM

## 2021-06-01 PROCEDURE — 99214 OFFICE O/P EST MOD 30 MIN: CPT | Performed by: FAMILY MEDICINE

## 2021-06-01 RX ORDER — MELATONIN
1000 DAILY
COMMUNITY

## 2021-06-01 RX ORDER — METHOCARBAMOL 500 MG/1
500 TABLET, FILM COATED ORAL 2 TIMES DAILY PRN
Qty: 30 TABLET | Refills: 0 | Status: SHIPPED | OUTPATIENT
Start: 2021-06-01 | End: 2022-05-23

## 2021-06-01 RX ORDER — NAPROXEN 500 MG/1
500 TABLET ORAL 2 TIMES DAILY WITH MEALS
Qty: 30 TABLET | Refills: 0 | Status: SHIPPED | OUTPATIENT
Start: 2021-06-01 | End: 2022-05-23

## 2021-06-01 NOTE — PROGRESS NOTES
Assessment/Plan:    Discussed diagnostic and treatment options with patient  Patient was started on naproxen 500 mg 1 b i d  with food and instructed to avoid ibuprofen and Aleve  Patient will be started on methocarbamol 500 mg 1 b i d  p r n  caution regarding drowsiness  Patient given handout on range of motion stretching exercises for the neck and may apply ice alternating with heat for 20 minutes each reg Martinez Return the office in 1 week or call sooner reg Martinez Diagnoses and all orders for this visit:    Neck pain, acute  -     methocarbamol (ROBAXIN) 500 mg tablet; Take 1 tablet (500 mg total) by mouth 2 (two) times a day as needed for muscle spasms  -     naproxen (NAPROSYN) 500 mg tablet; Take 1 tablet (500 mg total) by mouth 2 (two) times a day with meals    Strain of neck muscle, initial encounter  -     methocarbamol (ROBAXIN) 500 mg tablet; Take 1 tablet (500 mg total) by mouth 2 (two) times a day as needed for muscle spasms  -     naproxen (NAPROSYN) 500 mg tablet; Take 1 tablet (500 mg total) by mouth 2 (two) times a day with meals    Other orders  -     cholecalciferol (VITAMIN D3) 1,000 units tablet; Take 1,000 Units by mouth daily          Subjective:      Patient ID: Abiodun Sandoval is a 45 y o  female  Patient started 5 days ago with neck pain and stiffness  Patient denies any specific injury although states she may have slept wrong on it and wears a head set all day at work  She admits to bilateral posterior neck pain and right-sided occipital headache  She denies any pain, numbness, tingling or weakness into her arms  She has treated this with Tylenol, ibuprofen and heat without significant relief  Headache   This is a new problem  The current episode started in the past 7 days  The problem occurs intermittently  The problem has been gradually improving  The pain is located in the right unilateral and occipital region  The pain does not radiate   The pain quality is not similar to prior headaches  The quality of the pain is described as aching  Associated symptoms include neck pain  Pertinent negatives include no anorexia, blurred vision, dizziness, fever, hearing loss, loss of balance, nausea, numbness, phonophobia, photophobia, tingling, tinnitus, visual change, vomiting or weakness  The symptoms are aggravated by activity and emotional stress  She has tried acetaminophen and NSAIDs for the symptoms  The treatment provided mild relief  There is no history of migraine headaches, migraines in the family or recent head traumas  Neck Pain   This is a new problem  The current episode started in the past 7 days  The problem occurs intermittently  The problem has been gradually improving  The pain is associated with a sleep position  The pain is present in the left side and right side  The quality of the pain is described as aching  The symptoms are aggravated by position and twisting  The pain is same all the time  Stiffness is present in the morning  Associated symptoms include headaches  Pertinent negatives include no chest pain, fever, numbness, pain with swallowing, photophobia, syncope, tingling, trouble swallowing, visual change or weakness  She has tried heat, acetaminophen, NSAIDs and home exercises for the symptoms  The treatment provided mild relief  The following portions of the patient's history were reviewed and updated as appropriate: allergies, current medications, past family history, past medical history, past social history, past surgical history and problem list     Review of Systems   Constitutional: Negative for fever  HENT: Negative for hearing loss, tinnitus and trouble swallowing  Eyes: Negative for blurred vision and photophobia  Cardiovascular: Negative for chest pain and syncope  Gastrointestinal: Negative for anorexia, nausea and vomiting  Musculoskeletal: Positive for neck pain  Neurological: Positive for headaches   Negative for dizziness, tingling, weakness, numbness and loss of balance  Objective:      /82 (BP Location: Left arm, Patient Position: Sitting, Cuff Size: Large)   Pulse 84   Temp 97 6 °F (36 4 °C) (Temporal)   Resp 16   Ht 5' 0 5" (1 537 m)   Wt 87 2 kg (192 lb 3 2 oz)   LMP  (LMP Unknown)   SpO2 99%   BMI 36 92 kg/m²          Physical Exam  Constitutional:       General: She is not in acute distress  Appearance: Normal appearance  HENT:      Head: Normocephalic  Mouth/Throat:      Mouth: Mucous membranes are moist    Eyes:      General: No scleral icterus  Conjunctiva/sclera: Conjunctivae normal    Neck:      Musculoskeletal: Muscular tenderness present  Comments: Neck range of motion slightly decreased in rotation to the right and left  Flexion and extension intact  Mild bilateral cervical paravertebral muscle tenderness  Upper extremity strength and DTRs intact  Cardiovascular:      Rate and Rhythm: Normal rate and regular rhythm  Pulmonary:      Effort: Pulmonary effort is normal    Abdominal:      Palpations: Abdomen is soft  Tenderness: There is no abdominal tenderness  Musculoskeletal:      Right lower leg: No edema  Left lower leg: No edema  Lymphadenopathy:      Cervical: No cervical adenopathy  Skin:     General: Skin is warm and dry  Neurological:      General: No focal deficit present  Mental Status: She is alert and oriented to person, place, and time  Motor: No weakness  Deep Tendon Reflexes: Reflexes normal    Psychiatric:         Mood and Affect: Mood normal          Behavior: Behavior normal          Thought Content:  Thought content normal          Judgment: Judgment normal

## 2021-07-20 DIAGNOSIS — F41.9 ANXIETY: ICD-10-CM

## 2021-07-21 RX ORDER — ALPRAZOLAM 0.25 MG/1
0.25 TABLET ORAL 2 TIMES DAILY PRN
Qty: 30 TABLET | Refills: 0 | Status: SHIPPED | OUTPATIENT
Start: 2021-07-21 | End: 2021-09-01 | Stop reason: SDUPTHER

## 2021-07-22 DIAGNOSIS — E03.9 HYPOTHYROIDISM, UNSPECIFIED TYPE: ICD-10-CM

## 2021-07-26 RX ORDER — LEVOTHYROXINE SODIUM 0.05 MG/1
50 TABLET ORAL DAILY
Qty: 90 TABLET | Refills: 0 | Status: SHIPPED | OUTPATIENT
Start: 2021-07-26 | End: 2021-10-26 | Stop reason: SDUPTHER

## 2021-09-01 DIAGNOSIS — F41.9 ANXIETY: ICD-10-CM

## 2021-09-02 RX ORDER — ALPRAZOLAM 0.25 MG/1
0.25 TABLET ORAL 2 TIMES DAILY PRN
Qty: 30 TABLET | Refills: 0 | Status: SHIPPED | OUTPATIENT
Start: 2021-09-02 | End: 2021-10-14 | Stop reason: SDUPTHER

## 2021-09-09 ENCOUNTER — TELEPHONE (OUTPATIENT)
Dept: FAMILY MEDICINE CLINIC | Facility: CLINIC | Age: 51
End: 2021-09-09

## 2021-09-09 DIAGNOSIS — E03.9 HYPOTHYROIDISM, UNSPECIFIED TYPE: Primary | ICD-10-CM

## 2021-09-09 DIAGNOSIS — R73.02 GLUCOSE INTOLERANCE (IMPAIRED GLUCOSE TOLERANCE): ICD-10-CM

## 2021-09-09 DIAGNOSIS — E78.5 HYPERLIPIDEMIA, UNSPECIFIED HYPERLIPIDEMIA TYPE: ICD-10-CM

## 2021-09-18 ENCOUNTER — APPOINTMENT (OUTPATIENT)
Dept: LAB | Age: 51
End: 2021-09-18
Payer: COMMERCIAL

## 2021-09-18 DIAGNOSIS — E78.5 HYPERLIPIDEMIA, UNSPECIFIED HYPERLIPIDEMIA TYPE: ICD-10-CM

## 2021-09-18 DIAGNOSIS — R73.02 GLUCOSE INTOLERANCE (IMPAIRED GLUCOSE TOLERANCE): ICD-10-CM

## 2021-09-18 DIAGNOSIS — E03.9 HYPOTHYROIDISM, UNSPECIFIED TYPE: ICD-10-CM

## 2021-09-18 LAB
ALBUMIN SERPL BCP-MCNC: 3.4 G/DL (ref 3.5–5)
ALP SERPL-CCNC: 100 U/L (ref 46–116)
ALT SERPL W P-5'-P-CCNC: 32 U/L (ref 12–78)
ANION GAP SERPL CALCULATED.3IONS-SCNC: 1 MMOL/L (ref 4–13)
AST SERPL W P-5'-P-CCNC: 20 U/L (ref 5–45)
BILIRUB SERPL-MCNC: 0.56 MG/DL (ref 0.2–1)
BUN SERPL-MCNC: 13 MG/DL (ref 5–25)
CALCIUM ALBUM COR SERPL-MCNC: 9.5 MG/DL (ref 8.3–10.1)
CALCIUM SERPL-MCNC: 9 MG/DL (ref 8.3–10.1)
CHLORIDE SERPL-SCNC: 108 MMOL/L (ref 100–108)
CHOLEST SERPL-MCNC: 210 MG/DL (ref 50–200)
CO2 SERPL-SCNC: 29 MMOL/L (ref 21–32)
CREAT SERPL-MCNC: 0.96 MG/DL (ref 0.6–1.3)
EST. AVERAGE GLUCOSE BLD GHB EST-MCNC: 137 MG/DL
GFR SERPL CREATININE-BSD FRML MDRD: 68 ML/MIN/1.73SQ M
GLUCOSE P FAST SERPL-MCNC: 100 MG/DL (ref 65–99)
HBA1C MFR BLD: 6.4 %
HDLC SERPL-MCNC: 60 MG/DL
LDLC SERPL CALC-MCNC: 133 MG/DL (ref 0–100)
NONHDLC SERPL-MCNC: 150 MG/DL
POTASSIUM SERPL-SCNC: 4.4 MMOL/L (ref 3.5–5.3)
PROT SERPL-MCNC: 7.5 G/DL (ref 6.4–8.2)
SODIUM SERPL-SCNC: 138 MMOL/L (ref 136–145)
TRIGL SERPL-MCNC: 87 MG/DL
TSH SERPL DL<=0.05 MIU/L-ACNC: 3.18 UIU/ML (ref 0.36–3.74)

## 2021-09-18 PROCEDURE — 84443 ASSAY THYROID STIM HORMONE: CPT

## 2021-09-18 PROCEDURE — 83036 HEMOGLOBIN GLYCOSYLATED A1C: CPT

## 2021-09-18 PROCEDURE — 80053 COMPREHEN METABOLIC PANEL: CPT

## 2021-09-18 PROCEDURE — 80061 LIPID PANEL: CPT

## 2021-09-18 PROCEDURE — 36415 COLL VENOUS BLD VENIPUNCTURE: CPT

## 2021-10-14 DIAGNOSIS — F41.9 ANXIETY: ICD-10-CM

## 2021-10-14 RX ORDER — ALPRAZOLAM 0.25 MG/1
0.25 TABLET ORAL 2 TIMES DAILY PRN
Qty: 30 TABLET | Refills: 0 | Status: SHIPPED | OUTPATIENT
Start: 2021-10-14 | End: 2021-11-18 | Stop reason: SDUPTHER

## 2021-10-26 DIAGNOSIS — E03.9 HYPOTHYROIDISM, UNSPECIFIED TYPE: ICD-10-CM

## 2021-10-26 RX ORDER — LEVOTHYROXINE SODIUM 0.05 MG/1
50 TABLET ORAL DAILY
Qty: 90 TABLET | Refills: 0 | Status: SHIPPED | OUTPATIENT
Start: 2021-10-26 | End: 2022-01-20

## 2021-11-18 DIAGNOSIS — F41.9 ANXIETY: ICD-10-CM

## 2021-11-19 RX ORDER — ALPRAZOLAM 0.25 MG/1
0.25 TABLET ORAL 2 TIMES DAILY PRN
Qty: 30 TABLET | Refills: 0 | Status: SHIPPED | OUTPATIENT
Start: 2021-11-19 | End: 2021-12-20 | Stop reason: SDUPTHER

## 2021-12-20 DIAGNOSIS — F41.9 ANXIETY: ICD-10-CM

## 2021-12-22 RX ORDER — ALPRAZOLAM 0.25 MG/1
0.25 TABLET ORAL 2 TIMES DAILY PRN
Qty: 30 TABLET | Refills: 0 | Status: SHIPPED | OUTPATIENT
Start: 2021-12-22 | End: 2022-01-19 | Stop reason: SDUPTHER

## 2022-01-02 NOTE — TELEPHONE ENCOUNTER
Mirian Ortiz called and stated her pharmacy told her they changed manufacturers on her Levothyroxine and advised her to have her blood work done    Please advise  Thank you stable, resume home antihypertensives with hold parameters as sepsis is improving stable, resume home antihypertensives with hold parameters as sepsis is improving stable, resume home antihypertensives with hold parameters as sepsis is improving stable, resume home antihypertensives with hold parameters as sepsis is improving stable, resume home antihypertensives with hold parameters as sepsis is improving stable, resume home antihypertensives with hold parameters as sepsis is improving

## 2022-01-19 DIAGNOSIS — F41.9 ANXIETY: ICD-10-CM

## 2022-01-20 DIAGNOSIS — E03.9 HYPOTHYROIDISM, UNSPECIFIED TYPE: ICD-10-CM

## 2022-01-20 RX ORDER — ALPRAZOLAM 0.25 MG/1
0.25 TABLET ORAL 2 TIMES DAILY PRN
Qty: 30 TABLET | Refills: 0 | Status: SHIPPED | OUTPATIENT
Start: 2022-01-20 | End: 2022-02-20

## 2022-01-20 RX ORDER — LEVOTHYROXINE SODIUM 0.05 MG/1
TABLET ORAL
Qty: 90 TABLET | Refills: 0 | Status: SHIPPED | OUTPATIENT
Start: 2022-01-20 | End: 2022-04-21 | Stop reason: SDUPTHER

## 2022-02-19 DIAGNOSIS — F41.9 ANXIETY: ICD-10-CM

## 2022-02-20 RX ORDER — ALPRAZOLAM 0.25 MG/1
TABLET ORAL
Qty: 30 TABLET | Refills: 0 | Status: SHIPPED | OUTPATIENT
Start: 2022-02-20 | End: 2022-03-22 | Stop reason: SDUPTHER

## 2022-03-22 DIAGNOSIS — F41.9 ANXIETY: ICD-10-CM

## 2022-03-23 RX ORDER — ALPRAZOLAM 0.25 MG/1
0.25 TABLET ORAL 2 TIMES DAILY PRN
Qty: 30 TABLET | Refills: 0 | Status: SHIPPED | OUTPATIENT
Start: 2022-03-23 | End: 2022-04-21 | Stop reason: SDUPTHER

## 2022-04-21 DIAGNOSIS — F41.9 ANXIETY: ICD-10-CM

## 2022-04-21 DIAGNOSIS — E03.9 HYPOTHYROIDISM, UNSPECIFIED TYPE: ICD-10-CM

## 2022-04-22 RX ORDER — ALPRAZOLAM 0.25 MG/1
0.25 TABLET ORAL 2 TIMES DAILY PRN
Qty: 30 TABLET | Refills: 0 | Status: SHIPPED | OUTPATIENT
Start: 2022-04-22 | End: 2022-05-23 | Stop reason: SDUPTHER

## 2022-04-22 RX ORDER — LEVOTHYROXINE SODIUM 0.05 MG/1
50 TABLET ORAL DAILY
Qty: 90 TABLET | Refills: 0 | Status: SHIPPED | OUTPATIENT
Start: 2022-04-22 | End: 2022-07-21 | Stop reason: SDUPTHER

## 2022-04-22 NOTE — TELEPHONE ENCOUNTER
Failed protocol  Last OV 6/1/2021        Psychiatry:  Anxiolytics/Hypnotics Failed     This refill cannot be delegated    Valid encounter within last 6 months

## 2022-05-16 DIAGNOSIS — R73.02 GLUCOSE INTOLERANCE (IMPAIRED GLUCOSE TOLERANCE): ICD-10-CM

## 2022-05-16 DIAGNOSIS — E03.9 HYPOTHYROIDISM, UNSPECIFIED TYPE: Primary | ICD-10-CM

## 2022-05-16 DIAGNOSIS — E78.5 HYPERLIPIDEMIA, UNSPECIFIED HYPERLIPIDEMIA TYPE: ICD-10-CM

## 2022-05-16 DIAGNOSIS — E55.9 VITAMIN D DEFICIENCY: ICD-10-CM

## 2022-05-21 ENCOUNTER — HOSPITAL ENCOUNTER (OUTPATIENT)
Dept: RADIOLOGY | Age: 52
Discharge: HOME | End: 2022-05-21
Payer: COMMERCIAL

## 2022-05-21 ENCOUNTER — APPOINTMENT (OUTPATIENT)
Dept: LAB | Age: 52
End: 2022-05-21
Payer: COMMERCIAL

## 2022-05-21 VITALS — HEIGHT: 61 IN | WEIGHT: 192 LBS | BODY MASS INDEX: 36.25 KG/M2

## 2022-05-21 DIAGNOSIS — E55.9 VITAMIN D DEFICIENCY: ICD-10-CM

## 2022-05-21 DIAGNOSIS — Z12.31 ENCOUNTER FOR SCREENING MAMMOGRAM FOR MALIGNANT NEOPLASM OF BREAST: ICD-10-CM

## 2022-05-21 DIAGNOSIS — E78.5 HYPERLIPIDEMIA, UNSPECIFIED HYPERLIPIDEMIA TYPE: ICD-10-CM

## 2022-05-21 DIAGNOSIS — E03.9 HYPOTHYROIDISM, UNSPECIFIED TYPE: ICD-10-CM

## 2022-05-21 DIAGNOSIS — R73.02 GLUCOSE INTOLERANCE (IMPAIRED GLUCOSE TOLERANCE): ICD-10-CM

## 2022-05-21 LAB
25(OH)D3 SERPL-MCNC: 46.7 NG/ML (ref 30–100)
ALBUMIN SERPL BCP-MCNC: 3.7 G/DL (ref 3.5–5)
ALP SERPL-CCNC: 95 U/L (ref 46–116)
ALT SERPL W P-5'-P-CCNC: 30 U/L (ref 12–78)
ANION GAP SERPL CALCULATED.3IONS-SCNC: 4 MMOL/L (ref 4–13)
AST SERPL W P-5'-P-CCNC: 18 U/L (ref 5–45)
BILIRUB SERPL-MCNC: 0.62 MG/DL (ref 0.2–1)
BILIRUB UR QL STRIP: NEGATIVE
BUN SERPL-MCNC: 18 MG/DL (ref 5–25)
CALCIUM SERPL-MCNC: 10 MG/DL (ref 8.3–10.1)
CHLORIDE SERPL-SCNC: 106 MMOL/L (ref 100–108)
CHOLEST SERPL-MCNC: 251 MG/DL
CLARITY UR: CLEAR
CO2 SERPL-SCNC: 30 MMOL/L (ref 21–32)
COLOR UR: NORMAL
CREAT SERPL-MCNC: 1.03 MG/DL (ref 0.6–1.3)
ERYTHROCYTE [DISTWIDTH] IN BLOOD BY AUTOMATED COUNT: 13.2 % (ref 11.6–15.1)
EST. AVERAGE GLUCOSE BLD GHB EST-MCNC: 143 MG/DL
GFR SERPL CREATININE-BSD FRML MDRD: 62 ML/MIN/1.73SQ M
GLUCOSE P FAST SERPL-MCNC: 105 MG/DL (ref 65–99)
GLUCOSE UR STRIP-MCNC: NEGATIVE MG/DL
HBA1C MFR BLD: 6.6 %
HCT VFR BLD AUTO: 41.7 % (ref 34.8–46.1)
HDLC SERPL-MCNC: 66 MG/DL
HGB BLD-MCNC: 13.3 G/DL (ref 11.5–15.4)
HGB UR QL STRIP.AUTO: NEGATIVE
KETONES UR STRIP-MCNC: NEGATIVE MG/DL
LDLC SERPL CALC-MCNC: 168 MG/DL (ref 0–100)
LEUKOCYTE ESTERASE UR QL STRIP: NEGATIVE
MCH RBC QN AUTO: 27.7 PG (ref 26.8–34.3)
MCHC RBC AUTO-ENTMCNC: 31.9 G/DL (ref 31.4–37.4)
MCV RBC AUTO: 87 FL (ref 82–98)
NITRITE UR QL STRIP: NEGATIVE
NONHDLC SERPL-MCNC: 185 MG/DL
PH UR STRIP.AUTO: 7 [PH]
PLATELET # BLD AUTO: 375 THOUSANDS/UL (ref 149–390)
PMV BLD AUTO: 9.5 FL (ref 8.9–12.7)
POTASSIUM SERPL-SCNC: 4.4 MMOL/L (ref 3.5–5.3)
PROT SERPL-MCNC: 8 G/DL (ref 6.4–8.2)
PROT UR STRIP-MCNC: NEGATIVE MG/DL
RBC # BLD AUTO: 4.8 MILLION/UL (ref 3.81–5.12)
SODIUM SERPL-SCNC: 140 MMOL/L (ref 136–145)
SP GR UR STRIP.AUTO: 1.01 (ref 1–1.03)
TRIGL SERPL-MCNC: 87 MG/DL
TSH SERPL DL<=0.05 MIU/L-ACNC: 3.41 UIU/ML (ref 0.45–4.5)
UROBILINOGEN UR STRIP-ACNC: <2 MG/DL
WBC # BLD AUTO: 7.26 THOUSAND/UL (ref 4.31–10.16)

## 2022-05-21 PROCEDURE — 80061 LIPID PANEL: CPT

## 2022-05-21 PROCEDURE — 82306 VITAMIN D 25 HYDROXY: CPT

## 2022-05-21 PROCEDURE — 85027 COMPLETE CBC AUTOMATED: CPT

## 2022-05-21 PROCEDURE — 84443 ASSAY THYROID STIM HORMONE: CPT

## 2022-05-21 PROCEDURE — 83036 HEMOGLOBIN GLYCOSYLATED A1C: CPT

## 2022-05-21 PROCEDURE — 77067 SCR MAMMO BI INCL CAD: CPT

## 2022-05-21 PROCEDURE — 81003 URINALYSIS AUTO W/O SCOPE: CPT

## 2022-05-21 PROCEDURE — 77063 BREAST TOMOSYNTHESIS BI: CPT

## 2022-05-21 PROCEDURE — 80053 COMPREHEN METABOLIC PANEL: CPT

## 2022-05-21 PROCEDURE — 36415 COLL VENOUS BLD VENIPUNCTURE: CPT

## 2022-05-23 ENCOUNTER — OFFICE VISIT (OUTPATIENT)
Dept: FAMILY MEDICINE CLINIC | Facility: CLINIC | Age: 52
End: 2022-05-23
Payer: COMMERCIAL

## 2022-05-23 VITALS
OXYGEN SATURATION: 96 % | DIASTOLIC BLOOD PRESSURE: 86 MMHG | HEIGHT: 61 IN | TEMPERATURE: 97.6 F | RESPIRATION RATE: 16 BRPM | HEART RATE: 72 BPM | SYSTOLIC BLOOD PRESSURE: 134 MMHG | BODY MASS INDEX: 34.93 KG/M2 | WEIGHT: 185 LBS

## 2022-05-23 DIAGNOSIS — E55.9 VITAMIN D DEFICIENCY: ICD-10-CM

## 2022-05-23 DIAGNOSIS — F41.9 ANXIETY: ICD-10-CM

## 2022-05-23 DIAGNOSIS — R73.02 GLUCOSE INTOLERANCE (IMPAIRED GLUCOSE TOLERANCE): ICD-10-CM

## 2022-05-23 DIAGNOSIS — E03.9 HYPOTHYROIDISM, UNSPECIFIED TYPE: Primary | ICD-10-CM

## 2022-05-23 DIAGNOSIS — K21.9 GERD WITHOUT ESOPHAGITIS: ICD-10-CM

## 2022-05-23 DIAGNOSIS — E66.9 OBESITY (BMI 35.0-39.9 WITHOUT COMORBIDITY): ICD-10-CM

## 2022-05-23 PROCEDURE — 99214 OFFICE O/P EST MOD 30 MIN: CPT | Performed by: FAMILY MEDICINE

## 2022-05-23 RX ORDER — ALPRAZOLAM 0.25 MG/1
0.25 TABLET ORAL 2 TIMES DAILY PRN
Qty: 30 TABLET | Refills: 0 | Status: SHIPPED | OUTPATIENT
Start: 2022-05-23

## 2022-05-23 NOTE — PROGRESS NOTES
Assessment/Plan:  Discussed treatment options with patient  Patient will start sertraline 50 mg half a tablet daily for 1-2 weeks with food then increase to 1 tablet daily  Discussed potential side effects  Patient to continue other medications same and discussed alprazolam use p r n  only and addiction potential   Patient instructed follow a low-fat, low-salt and a low sugar/carbohydrate diet more carefully and get regular aerobic exercise walking 150 minutes per week  Weight loss encouraged  Return to the office in 6 months  Diagnoses and all orders for this visit:    Hypothyroidism, unspecified type    GERD without esophagitis    Anxiety  -     sertraline (ZOLOFT) 50 mg tablet; Take 1 tablet (50 mg total) by mouth in the morning   -     ALPRAZolam (XANAX) 0 25 mg tablet; Take 1 tablet (0 25 mg total) by mouth as needed in the morning and 1 tablet (0 25 mg total) as needed in the evening for anxiety  Glucose intolerance (impaired glucose tolerance)    Obesity (BMI 35 0-39 9 without comorbidity)    Vitamin D deficiency    Anxiety  Comments:  Alprazolam 0 25 mg b i d  p r n   Orders:  -     sertraline (ZOLOFT) 50 mg tablet; Take 1 tablet (50 mg total) by mouth in the morning   -     ALPRAZolam (XANAX) 0 25 mg tablet; Take 1 tablet (0 25 mg total) by mouth as needed in the morning and 1 tablet (0 25 mg total) as needed in the evening for anxiety  Subjective:      Patient ID: Levar Friday is a 45 y o  female  Patient is here for follow-up appoint for chronic conditions  Patient admits to ongoing anxiety relieved with alprazolam p r n  Yasmanik Nimesh Patient otherwise feeling well overall and exercises regularly walking almost daily  Reviewed recent fasting labs  Thyroid Problem  Presents for follow-up visit  Symptoms include anxiety, constipation and dry skin   Patient reports no cold intolerance, depressed mood, diaphoresis, diarrhea, fatigue, hair loss, heat intolerance, hoarse voice, leg swelling, palpitations, tremors, visual change, weight gain or weight loss  The symptoms have been stable  Heartburn  She complains of belching and heartburn  She reports no abdominal pain, no chest pain, no choking, no coughing, no dysphagia, no early satiety, no globus sensation, no hoarse voice or no nausea  The problem occurs occasionally  The problem has been gradually improving  The heartburn does not wake her from sleep  The heartburn does not limit her activity  The symptoms are aggravated by certain foods and lying down  Pertinent negatives include no anemia, fatigue, melena, muscle weakness, orthopnea or weight loss  Risk factors include hiatal hernia and obesity  She has tried a PPI for the symptoms  The treatment provided significant relief  Past procedures include an EGD and a UGI  Anxiety  Presents for follow-up visit  Symptoms include excessive worry, insomnia, irritability, nervous/anxious behavior and restlessness  Patient reports no chest pain, confusion, decreased concentration, depressed mood, hyperventilation, nausea, palpitations, panic, shortness of breath or suicidal ideas  Symptoms occur occasionally  The quality of sleep is fair  Nighttime awakenings: occasional            The following portions of the patient's history were reviewed and updated as appropriate: allergies, current medications, past family history, past medical history, past social history, past surgical history and problem list     Review of Systems   Constitutional: Positive for irritability  Negative for diaphoresis, fatigue, weight gain and weight loss  HENT: Negative for hoarse voice  Respiratory: Negative for cough, choking and shortness of breath  Cardiovascular: Negative for chest pain and palpitations  Gastrointestinal: Positive for constipation and heartburn  Negative for abdominal pain, diarrhea, dysphagia, melena and nausea  Endocrine: Negative for cold intolerance and heat intolerance  Musculoskeletal: Negative for muscle weakness  Neurological: Negative for tremors  Psychiatric/Behavioral: Negative for confusion, decreased concentration and suicidal ideas  The patient is nervous/anxious and has insomnia  Objective:      /86   Pulse 72   Temp 97 6 °F (36 4 °C) (Skin)   Resp 16   Ht 5' 0 6" (1 539 m)   Wt 83 9 kg (185 lb)   LMP  (LMP Unknown)   SpO2 96%   BMI 35 42 kg/m²          Physical Exam  Constitutional:       General: She is not in acute distress  Appearance: Normal appearance  She is obese  HENT:      Head: Normocephalic  Mouth/Throat:      Mouth: Mucous membranes are moist    Eyes:      General: No scleral icterus  Conjunctiva/sclera: Conjunctivae normal    Neck:      Vascular: No carotid bruit  Cardiovascular:      Rate and Rhythm: Normal rate and regular rhythm  Pulmonary:      Effort: Pulmonary effort is normal       Breath sounds: Normal breath sounds  Abdominal:      Palpations: Abdomen is soft  Tenderness: There is no abdominal tenderness  Musculoskeletal:      Cervical back: Neck supple  Right lower leg: No edema  Left lower leg: No edema  Lymphadenopathy:      Cervical: No cervical adenopathy  Skin:     General: Skin is warm and dry  Neurological:      General: No focal deficit present  Mental Status: She is alert and oriented to person, place, and time  Psychiatric:         Mood and Affect: Mood normal          Behavior: Behavior normal          Thought Content: Thought content normal          Judgment: Judgment normal          BMI Counseling: Body mass index is 35 42 kg/m²   The BMI is above normal  Nutrition recommendations include reducing portion sizes, decreasing overall calorie intake, 3-5 servings of fruits/vegetables daily, reducing fast food intake, consuming healthier snacks, decreasing soda and/or juice intake, moderation in carbohydrate intake, increasing intake of lean protein, reducing intake of saturated fat and trans fat and reducing intake of cholesterol  Exercise recommendations include moderate aerobic physical activity for 150 minutes/week

## 2022-06-20 DIAGNOSIS — L30.9 ECZEMA, UNSPECIFIED TYPE: ICD-10-CM

## 2022-07-16 DIAGNOSIS — F41.9 ANXIETY: ICD-10-CM

## 2022-07-21 DIAGNOSIS — E03.9 HYPOTHYROIDISM, UNSPECIFIED TYPE: ICD-10-CM

## 2022-07-22 RX ORDER — LEVOTHYROXINE SODIUM 0.05 MG/1
50 TABLET ORAL DAILY
Qty: 90 TABLET | Refills: 0 | Status: SHIPPED | OUTPATIENT
Start: 2022-07-22 | End: 2022-10-20

## 2022-07-31 ENCOUNTER — OFFICE VISIT (OUTPATIENT)
Dept: URGENT CARE | Facility: CLINIC | Age: 52
End: 2022-07-31
Payer: COMMERCIAL

## 2022-07-31 VITALS
OXYGEN SATURATION: 99 % | TEMPERATURE: 97.8 F | RESPIRATION RATE: 18 BRPM | DIASTOLIC BLOOD PRESSURE: 68 MMHG | HEIGHT: 61 IN | SYSTOLIC BLOOD PRESSURE: 144 MMHG | HEART RATE: 69 BPM | BODY MASS INDEX: 33.99 KG/M2 | WEIGHT: 180 LBS

## 2022-07-31 DIAGNOSIS — L03.119 CELLULITIS OF LOWER EXTREMITY, UNSPECIFIED LATERALITY: Primary | ICD-10-CM

## 2022-07-31 PROCEDURE — 99213 OFFICE O/P EST LOW 20 MIN: CPT | Performed by: NURSE PRACTITIONER

## 2022-07-31 RX ORDER — CEPHALEXIN 500 MG/1
500 CAPSULE ORAL EVERY 6 HOURS SCHEDULED
Qty: 28 CAPSULE | Refills: 0 | Status: SHIPPED | OUTPATIENT
Start: 2022-07-31 | End: 2022-08-07

## 2022-07-31 NOTE — PROGRESS NOTES
[facility]        NAME: Abiodun Sandoval is a 47 y o  female  : 1970    MRN: [id number]  DATE: 2022  TIME: 1:04 PM    Assessment and Plan   Cellulitis of lower extremity, unspecified laterality [L03 119]  1  Cellulitis of lower extremity, unspecified laterality  cephalexin (KEFLEX) 500 mg capsule         Patient Instructions       Follow up with PCP in 3-5 days  Proceed to  ER if symptoms worsen  Chief Complaint     Chief Complaint   Patient presents with    Insect Bite     Patient presents with bug bites to BLE that she thinks are infected  History of Present Illness       Insect Bite  This is a new problem  The current episode started in the past 7 days  The problem occurs constantly  The problem has been gradually worsening  Associated symptoms include a rash  Pertinent negatives include no arthralgias, chest pain, chills, coughing, diaphoresis, fatigue, fever, headaches, joint swelling, myalgias or sore throat  Nothing aggravates the symptoms  Treatments tried: benadryl cream        Review of Systems   Review of Systems   Constitutional: Negative for chills, diaphoresis, fatigue and fever  HENT: Negative for facial swelling, sore throat and trouble swallowing  Respiratory: Negative for cough, chest tightness, shortness of breath, wheezing and stridor  Cardiovascular: Negative for chest pain and palpitations  Gastrointestinal: Negative  Musculoskeletal: Negative for arthralgias, back pain, joint swelling and myalgias  Skin: Positive for rash  Negative for wound  Neurological: Negative for headaches  Current Medications       Current Outpatient Medications:     ALPRAZolam (XANAX) 0 25 mg tablet, Take 1 tablet (0 25 mg total) by mouth as needed in the morning and 1 tablet (0 25 mg total) as needed in the evening for anxiety  , Disp: 30 tablet, Rfl: 0    cephalexin (KEFLEX) 500 mg capsule, Take 1 capsule (500 mg total) by mouth every 6 (six) hours for 7 days, Disp: 28 capsule, Rfl: 0    Cetirizine HCl 10 MG CAPS, , Disp: , Rfl:     cholecalciferol (VITAMIN D3) 1,000 units tablet, Take 1,000 Units by mouth daily, Disp: , Rfl:     Esomeprazole Magnesium 20 MG TBEC, 2 tablets , Disp: , Rfl:     levothyroxine 50 mcg tablet, Take 1 tablet (50 mcg total) by mouth daily, Disp: 90 tablet, Rfl: 0    Multiple Vitamins-Minerals (MULTIVITAMIN WOMENS 50+ ADV PO), Take 1 tablet by mouth daily, Disp: , Rfl:     sertraline (ZOLOFT) 50 mg tablet, Take 1 tablet (50 mg total) by mouth daily, Disp: 90 tablet, Rfl: 2    triamcinolone (KENALOG) 0 1 % ointment, Apply topically 2 (two) times a day, Disp: 30 g, Rfl: 0    polyethylene glycol (MIRALAX) 17 g packet, , Disp: , Rfl:     Psyllium (METAMUCIL FIBER PO), , Disp: , Rfl:     Current Allergies     Allergies as of 07/31/2022    (No Known Allergies)            The following portions of the patient's history were reviewed and updated as appropriate: allergies, current medications, past family history, past medical history, past social history, past surgical history and problem list      Past Medical History:   Diagnosis Date    Allergic     Since I was a child    Anxiety     Asthma     Disease of thyroid gland     Eczema     GERD (gastroesophageal reflux disease)     Obesity     Since I was a child       Past Surgical History:   Procedure Laterality Date    CHOLECYSTECTOMY      DILATION AND CURETTAGE OF UTERUS      WISDOM TOOTH EXTRACTION         Family History   Problem Relation Age of Onset    Lymphoma Mother     Liver disease Mother     Dementia Mother     Heart disease Mother     Thyroid disease Mother     Cancer Mother     Other Father     Diabetes Father     Stroke Father     Hypertension Father     Heart disease Father     Hypertension Sister     No Known Problems Maternal Grandmother     Leukemia Maternal Grandfather     No Known Problems Paternal Grandmother     No Known Problems Paternal Grandfather     No Known Problems Paternal Aunt     No Known Problems Paternal Aunt          Medications have been verified  Objective   /68   Pulse 69   Temp 97 8 °F (36 6 °C) (Temporal)   Resp 18   Ht 5' 1" (1 549 m)   Wt 81 6 kg (180 lb)   LMP  (LMP Unknown)   SpO2 99%   BMI 34 01 kg/m²   No LMP recorded (lmp unknown)  Patient is postmenopausal        Physical Exam     Physical Exam  Constitutional:       General: She is not in acute distress  Appearance: Normal appearance  She is not diaphoretic  HENT:      Head: Normocephalic and atraumatic  Cardiovascular:      Rate and Rhythm: Normal rate and regular rhythm  Heart sounds: Normal heart sounds, S1 normal and S2 normal    Pulmonary:      Effort: Pulmonary effort is normal       Breath sounds: Normal breath sounds and air entry  Skin:     General: Skin is warm and dry  Capillary Refill: Capillary refill takes less than 2 seconds  Neurological:      Mental Status: She is alert

## 2022-09-29 DIAGNOSIS — F41.9 ANXIETY: ICD-10-CM

## 2022-09-30 RX ORDER — ALPRAZOLAM 0.25 MG/1
0.25 TABLET ORAL 2 TIMES DAILY PRN
Qty: 30 TABLET | Refills: 0 | Status: SHIPPED | OUTPATIENT
Start: 2022-09-30

## 2022-09-30 NOTE — TELEPHONE ENCOUNTER
Protocol Fail  ALPRAZolam (XANAX) 0 25 mg tablet          Sig: Take 1 tablet (0 25 mg total) by mouth 2 (two) times a day as needed for anxiety    Disp:  30 tablet    Refills:  0    Start: 9/29/2022    Class: Normal    Non-formulary For: Anxiety    To pharmacy: Not to exceed 5 additional fills before 07/19/2022    Last ordered: 4 months ago by Dany Alonzo DO     Psychiatry:  Anxiolytics/Hypnotics Failed 09/29/2022 08:25 PM   Protocol Details  This refill cannot be delegated    Valid encounter within last 6 months

## 2022-10-20 DIAGNOSIS — E03.9 HYPOTHYROIDISM, UNSPECIFIED TYPE: ICD-10-CM

## 2022-10-20 RX ORDER — LEVOTHYROXINE SODIUM 0.05 MG/1
TABLET ORAL
Qty: 90 TABLET | Refills: 0 | Status: SHIPPED | OUTPATIENT
Start: 2022-10-20 | End: 2022-10-27 | Stop reason: SDUPTHER

## 2022-10-27 DIAGNOSIS — E03.9 HYPOTHYROIDISM, UNSPECIFIED TYPE: ICD-10-CM

## 2022-10-27 RX ORDER — LEVOTHYROXINE SODIUM 0.05 MG/1
50 TABLET ORAL DAILY
Qty: 90 TABLET | Refills: 0 | Status: SHIPPED | OUTPATIENT
Start: 2022-10-27

## 2022-11-15 ENCOUNTER — OFFICE VISIT (OUTPATIENT)
Dept: FAMILY MEDICINE CLINIC | Facility: CLINIC | Age: 52
End: 2022-11-15

## 2022-11-15 VITALS
HEIGHT: 61 IN | BODY MASS INDEX: 36.85 KG/M2 | TEMPERATURE: 97.6 F | WEIGHT: 195.2 LBS | SYSTOLIC BLOOD PRESSURE: 132 MMHG | HEART RATE: 76 BPM | RESPIRATION RATE: 16 BRPM | DIASTOLIC BLOOD PRESSURE: 80 MMHG | OXYGEN SATURATION: 98 %

## 2022-11-15 DIAGNOSIS — R73.02 GLUCOSE INTOLERANCE (IMPAIRED GLUCOSE TOLERANCE): ICD-10-CM

## 2022-11-15 DIAGNOSIS — E55.9 VITAMIN D DEFICIENCY: ICD-10-CM

## 2022-11-15 DIAGNOSIS — K21.9 GERD WITHOUT ESOPHAGITIS: ICD-10-CM

## 2022-11-15 DIAGNOSIS — E66.9 OBESITY (BMI 35.0-39.9 WITHOUT COMORBIDITY): ICD-10-CM

## 2022-11-15 DIAGNOSIS — E03.9 HYPOTHYROIDISM, UNSPECIFIED TYPE: Primary | ICD-10-CM

## 2022-11-15 DIAGNOSIS — E78.5 HYPERLIPIDEMIA, UNSPECIFIED HYPERLIPIDEMIA TYPE: ICD-10-CM

## 2022-11-15 DIAGNOSIS — F41.9 ANXIETY: ICD-10-CM

## 2022-11-15 RX ORDER — BIOTIN 10000 MCG
CAPSULE ORAL
COMMUNITY

## 2022-11-15 NOTE — PROGRESS NOTES
Name: Abbey Jo      : 1970      MRN: [id number]  Encounter Provider: Kenia Lopez DO  Encounter Date: 11/15/2022   Encounter department: [address] [place] [address]   Patient given lab requisition for fasting labs as below  Patient to continue present treatment  Patient instructed to follow a low-fat, low-salt and a low sugar/carbohydrate diet more carefully and get regular aerobic exercise walking 150 minutes per week  Weight loss strongly encouraged and discussed losing 10% of body weight or approximately 20 lb over the next 6 months  Discussed morbidity associated with obesity  Return the office in 6 months  1  Hypothyroidism, unspecified type  -     TSH, 3rd generation with Free T4 reflex; Future    2  Glucose intolerance (impaired glucose tolerance)  -     Comprehensive metabolic panel; Future  -     HEMOGLOBIN A1C W/ EAG ESTIMATION; Future    3  GERD without esophagitis    4  Hyperlipidemia, unspecified hyperlipidemia type  -     Lipid panel; Future    5  Anxiety    6  Obesity (BMI 35 0-39 9 without comorbidity)    7  Vitamin D deficiency           Subjective      Patient is here with her sister present for follow-up appointment for chronic conditions  We reviewed fasting labs from 6 months ago  Patient has been feeling well overall and improved on sertraline although admits to no regular exercise and not following her diet carefully and weight is up 10 lb  Patient recently received yearly flu vaccine  Patient is up-to-date on mammogram and colonoscopy  Thyroid Problem  Presents for follow-up visit  Symptoms include anxiety, dry skin and weight gain  Patient reports no cold intolerance, constipation, depressed mood, diaphoresis, diarrhea, fatigue, hair loss, heat intolerance, hoarse voice, leg swelling, palpitations, tremors, visual change or weight loss  The symptoms have been stable  Review of Systems   Constitutional: Positive for weight gain  Negative for diaphoresis, fatigue and weight loss  HENT: Negative for hoarse voice  Cardiovascular: Negative for palpitations  Gastrointestinal: Negative for constipation and diarrhea  Endocrine: Negative for cold intolerance and heat intolerance  Neurological: Negative for tremors  Psychiatric/Behavioral: The patient is nervous/anxious  Current Outpatient Medications on File Prior to Visit   Medication Sig   • ALPRAZolam (XANAX) 0 25 mg tablet Take 1 tablet (0 25 mg total) by mouth 2 (two) times a day as needed for anxiety   • Biotin 10 MG CAPS Take by mouth   • Cetirizine HCl 10 MG CAPS    • cholecalciferol (VITAMIN D3) 1,000 units tablet Take 1,000 Units by mouth daily   • Esomeprazole Magnesium 20 MG TBEC 2 tablets    • levothyroxine 50 mcg tablet Take 1 tablet (50 mcg total) by mouth daily   • Multiple Vitamins-Minerals (MULTIVITAMIN WOMENS 50+ ADV PO) Take 1 tablet by mouth daily   • sertraline (ZOLOFT) 50 mg tablet Take 1 tablet (50 mg total) by mouth daily   • triamcinolone (KENALOG) 0 1 % ointment Apply topically 2 (two) times a day   • polyethylene glycol (MIRALAX) 17 g packet  (Patient not taking: No sig reported)   • Psyllium (METAMUCIL FIBER PO)  (Patient not taking: No sig reported)       Objective     /80   Pulse 76   Temp 97 6 °F (36 4 °C) (Temporal)   Resp 16   Ht 5' 1" (1 549 m)   Wt 88 5 kg (195 lb 3 2 oz)   LMP  (LMP Unknown)   SpO2 98%   BMI 36 88 kg/m²     Physical Exam  Constitutional:       General: She is not in acute distress  Appearance: Normal appearance  She is obese  HENT:      Head: Normocephalic  Mouth/Throat:      Mouth: Mucous membranes are moist    Eyes:      General: No scleral icterus  Conjunctiva/sclera: Conjunctivae normal    Neck:      Vascular: No carotid bruit  Cardiovascular:      Rate and Rhythm: Normal rate and regular rhythm  Pulmonary:      Effort: Pulmonary effort is normal       Breath sounds: Normal breath sounds  Abdominal:      Palpations: Abdomen is soft  Tenderness: There is no abdominal tenderness  Musculoskeletal:      Cervical back: Neck supple  Right lower leg: No edema  Left lower leg: No edema  Lymphadenopathy:      Cervical: No cervical adenopathy  Skin:     General: Skin is warm and dry  Neurological:      General: No focal deficit present  Mental Status: She is alert and oriented to person, place, and time  Psychiatric:         Mood and Affect: Mood normal          Behavior: Behavior normal          Thought Content:  Thought content normal          Judgment: Judgment normal        Sarita Bulla, DO

## 2022-12-03 ENCOUNTER — APPOINTMENT (OUTPATIENT)
Dept: LAB | Facility: CLINIC | Age: 52
End: 2022-12-03

## 2022-12-03 DIAGNOSIS — E78.5 HYPERLIPIDEMIA, UNSPECIFIED HYPERLIPIDEMIA TYPE: ICD-10-CM

## 2022-12-03 DIAGNOSIS — E03.9 HYPOTHYROIDISM, UNSPECIFIED TYPE: ICD-10-CM

## 2022-12-03 DIAGNOSIS — R73.02 GLUCOSE INTOLERANCE (IMPAIRED GLUCOSE TOLERANCE): ICD-10-CM

## 2022-12-03 LAB
ALBUMIN SERPL BCP-MCNC: 3.7 G/DL (ref 3.5–5)
ALP SERPL-CCNC: 99 U/L (ref 46–116)
ALT SERPL W P-5'-P-CCNC: 30 U/L (ref 12–78)
ANION GAP SERPL CALCULATED.3IONS-SCNC: 5 MMOL/L (ref 4–13)
AST SERPL W P-5'-P-CCNC: 20 U/L (ref 5–45)
BILIRUB SERPL-MCNC: 0.78 MG/DL (ref 0.2–1)
BUN SERPL-MCNC: 13 MG/DL (ref 5–25)
CALCIUM SERPL-MCNC: 9.5 MG/DL (ref 8.3–10.1)
CHLORIDE SERPL-SCNC: 105 MMOL/L (ref 96–108)
CHOLEST SERPL-MCNC: 277 MG/DL
CO2 SERPL-SCNC: 29 MMOL/L (ref 21–32)
CREAT SERPL-MCNC: 0.94 MG/DL (ref 0.6–1.3)
EST. AVERAGE GLUCOSE BLD GHB EST-MCNC: 134 MG/DL
GFR SERPL CREATININE-BSD FRML MDRD: 69 ML/MIN/1.73SQ M
GLUCOSE P FAST SERPL-MCNC: 96 MG/DL (ref 65–99)
HBA1C MFR BLD: 6.3 %
HDLC SERPL-MCNC: 69 MG/DL
LDLC SERPL CALC-MCNC: 184 MG/DL (ref 0–100)
NONHDLC SERPL-MCNC: 208 MG/DL
POTASSIUM SERPL-SCNC: 4.3 MMOL/L (ref 3.5–5.3)
PROT SERPL-MCNC: 7.4 G/DL (ref 6.4–8.4)
SODIUM SERPL-SCNC: 139 MMOL/L (ref 135–147)
TRIGL SERPL-MCNC: 122 MG/DL
TSH SERPL DL<=0.05 MIU/L-ACNC: 4.38 UIU/ML (ref 0.45–4.5)

## 2022-12-04 PROBLEM — E78.49 OTHER HYPERLIPIDEMIA: Status: ACTIVE | Noted: 2022-12-04

## 2022-12-05 DIAGNOSIS — E78.49 OTHER HYPERLIPIDEMIA: Primary | ICD-10-CM

## 2022-12-05 RX ORDER — ATORVASTATIN CALCIUM 10 MG/1
10 TABLET, FILM COATED ORAL DAILY
Qty: 30 TABLET | Refills: 5 | Status: SHIPPED | OUTPATIENT
Start: 2022-12-05

## 2023-01-19 DIAGNOSIS — E03.9 HYPOTHYROIDISM, UNSPECIFIED TYPE: ICD-10-CM

## 2023-01-20 RX ORDER — LEVOTHYROXINE SODIUM 0.05 MG/1
50 TABLET ORAL DAILY
Qty: 90 TABLET | Refills: 0 | Status: SHIPPED | OUTPATIENT
Start: 2023-01-20

## 2023-01-23 ENCOUNTER — TELEPHONE (OUTPATIENT)
Dept: FAMILY MEDICINE CLINIC | Facility: CLINIC | Age: 53
End: 2023-01-23

## 2023-01-23 DIAGNOSIS — F41.9 ANXIETY: Primary | ICD-10-CM

## 2023-01-23 NOTE — TELEPHONE ENCOUNTER
Received a call from the patient asking if she can have a few days of Sertraline called into her local pharmacy  [facility] said they will not get he refill to her for 2-5 days and she does not have enough to last her  She was asking for 5 days worth       Pharmacy: [facility] in [facility]

## 2023-01-30 DIAGNOSIS — E78.49 OTHER HYPERLIPIDEMIA: Primary | ICD-10-CM

## 2023-02-04 ENCOUNTER — APPOINTMENT (OUTPATIENT)
Dept: LAB | Age: 53
End: 2023-02-04

## 2023-02-04 DIAGNOSIS — E78.49 OTHER HYPERLIPIDEMIA: ICD-10-CM

## 2023-02-04 LAB
ALBUMIN SERPL BCP-MCNC: 3.5 G/DL (ref 3.5–5)
ALP SERPL-CCNC: 98 U/L (ref 46–116)
ALT SERPL W P-5'-P-CCNC: 30 U/L (ref 12–78)
AST SERPL W P-5'-P-CCNC: 19 U/L (ref 5–45)
BILIRUB DIRECT SERPL-MCNC: 0.13 MG/DL (ref 0–0.2)
BILIRUB SERPL-MCNC: 0.63 MG/DL (ref 0.2–1)
CHOLEST SERPL-MCNC: 176 MG/DL
HDLC SERPL-MCNC: 65 MG/DL
LDLC SERPL CALC-MCNC: 93 MG/DL (ref 0–100)
NONHDLC SERPL-MCNC: 111 MG/DL
PROT SERPL-MCNC: 7.2 G/DL (ref 6.4–8.4)
TRIGL SERPL-MCNC: 88 MG/DL

## 2023-03-02 DIAGNOSIS — E78.49 OTHER HYPERLIPIDEMIA: ICD-10-CM

## 2023-03-03 RX ORDER — ATORVASTATIN CALCIUM 10 MG/1
10 TABLET, FILM COATED ORAL DAILY
Qty: 30 TABLET | Refills: 0 | Status: SHIPPED | OUTPATIENT
Start: 2023-03-03 | End: 2023-03-06 | Stop reason: SDUPTHER

## 2023-03-05 ENCOUNTER — PATIENT MESSAGE (OUTPATIENT)
Dept: FAMILY MEDICINE CLINIC | Facility: CLINIC | Age: 53
End: 2023-03-05

## 2023-03-05 DIAGNOSIS — E78.49 OTHER HYPERLIPIDEMIA: ICD-10-CM

## 2023-03-06 RX ORDER — ATORVASTATIN CALCIUM 10 MG/1
10 TABLET, FILM COATED ORAL DAILY
Qty: 7 TABLET | Refills: 0 | Status: SHIPPED | OUTPATIENT
Start: 2023-03-06 | End: 2023-03-10 | Stop reason: SDUPTHER

## 2023-03-10 DIAGNOSIS — E78.49 OTHER HYPERLIPIDEMIA: ICD-10-CM

## 2023-03-10 RX ORDER — ATORVASTATIN CALCIUM 10 MG/1
10 TABLET, FILM COATED ORAL DAILY
Qty: 7 TABLET | Refills: 0 | Status: CANCELLED | OUTPATIENT
Start: 2023-03-10

## 2023-03-10 RX ORDER — ATORVASTATIN CALCIUM 10 MG/1
10 TABLET, FILM COATED ORAL DAILY
Qty: 90 TABLET | Refills: 0 | Status: SHIPPED | OUTPATIENT
Start: 2023-03-10

## 2023-04-06 DIAGNOSIS — E03.9 HYPOTHYROIDISM, UNSPECIFIED TYPE: ICD-10-CM

## 2023-04-06 DIAGNOSIS — F41.9 ANXIETY: ICD-10-CM

## 2023-04-06 RX ORDER — LEVOTHYROXINE SODIUM 0.05 MG/1
TABLET ORAL
Qty: 90 TABLET | Refills: 0 | Status: SHIPPED | OUTPATIENT
Start: 2023-04-06

## 2023-04-07 DIAGNOSIS — F41.9 ANXIETY: ICD-10-CM

## 2023-05-16 ENCOUNTER — OFFICE VISIT (OUTPATIENT)
Dept: FAMILY MEDICINE CLINIC | Facility: CLINIC | Age: 53
End: 2023-05-16

## 2023-05-16 VITALS
DIASTOLIC BLOOD PRESSURE: 70 MMHG | BODY MASS INDEX: 39.04 KG/M2 | OXYGEN SATURATION: 95 % | RESPIRATION RATE: 16 BRPM | TEMPERATURE: 98.5 F | HEIGHT: 61 IN | SYSTOLIC BLOOD PRESSURE: 118 MMHG | WEIGHT: 206.8 LBS | HEART RATE: 72 BPM

## 2023-05-16 DIAGNOSIS — F41.9 ANXIETY: ICD-10-CM

## 2023-05-16 DIAGNOSIS — L30.9 ECZEMA, UNSPECIFIED TYPE: ICD-10-CM

## 2023-05-16 DIAGNOSIS — R73.02 GLUCOSE INTOLERANCE (IMPAIRED GLUCOSE TOLERANCE): ICD-10-CM

## 2023-05-16 DIAGNOSIS — M25.512 ACUTE PAIN OF LEFT SHOULDER: ICD-10-CM

## 2023-05-16 DIAGNOSIS — E78.49 OTHER HYPERLIPIDEMIA: ICD-10-CM

## 2023-05-16 DIAGNOSIS — E55.9 VITAMIN D DEFICIENCY: ICD-10-CM

## 2023-05-16 DIAGNOSIS — E03.9 HYPOTHYROIDISM, UNSPECIFIED TYPE: Primary | ICD-10-CM

## 2023-05-16 RX ORDER — ALPRAZOLAM 0.25 MG/1
0.25 TABLET ORAL 2 TIMES DAILY PRN
Qty: 30 TABLET | Refills: 0 | Status: SHIPPED | OUTPATIENT
Start: 2023-05-16

## 2023-05-16 RX ORDER — MELOXICAM 15 MG/1
15 TABLET ORAL DAILY PRN
Qty: 30 TABLET | Refills: 1 | Status: SHIPPED | OUTPATIENT
Start: 2023-05-16

## 2023-05-16 NOTE — PROGRESS NOTES
Name: Margarita Munguia      : 1970      MRN: [id number]  Encounter Provider: Dallin Castro DO  Encounter Date: 2023   Encounter department: [address] [place] [address]   Patient given lab requisition for fasting labs as below  Patient being sent for x-ray of the left shoulder  Will heed results  Patient be started on meloxicam 15 mg 1 daily with food and instructed to avoid ibuprofen and naproxen  Patient may apply ice for 20 minutes as needed  Recommend range of motion exercises but no resistance exercises  Patient is being referred for physical therapy evaluation and treatment if not improving  Patient to continue present treatment  Instructed to follow a low-fat, low-salt and a low sugar/carbohydrate diet more carefully and to get regular aerobic exercise walking 150 minutes/week  Weight loss encouraged  Return to the office in 6 months or call sooner as needed  1  Hypothyroidism, unspecified type  -     CBC and Platelet; Future  -     TSH, 3rd generation with Free T4 reflex; Future    2  Other hyperlipidemia  -     Comprehensive metabolic panel; Future  -     Lipid panel; Future    3  Glucose intolerance (impaired glucose tolerance)  -     Comprehensive metabolic panel; Future  -     HEMOGLOBIN A1C W/ EAG ESTIMATION; Future  -     UA w Reflex to Microscopic w Reflex to Culture -Lab Collect; Future; Expected date: 2023    4  Acute pain of left shoulder  -     XR shoulder 2+ vw left; Future; Expected date: 2023  -     meloxicam (MOBIC) 15 mg tablet; Take 1 tablet (15 mg total) by mouth daily as needed for moderate pain  -     Ambulatory Referral to Physical Therapy; Future    5  Eczema, unspecified type  -     triamcinolone (KENALOG) 0 1 % ointment; Apply topically 2 (two) times a day    6  Anxiety  -     sertraline (ZOLOFT) 50 mg tablet; Take 1 tablet (50 mg total) by mouth daily  -     ALPRAZolam (XANAX) 0 25 mg tablet;  Take 1 tablet (0 25 mg total) by mouth 2 (two) times a day as needed for anxiety    7  Anxiety  Comments:  Alprazolam 0 25 mg b i d  p r n   Orders:  -     sertraline (ZOLOFT) 50 mg tablet; Take 1 tablet (50 mg total) by mouth daily  -     ALPRAZolam (XANAX) 0 25 mg tablet; Take 1 tablet (0 25 mg total) by mouth 2 (two) times a day as needed for anxiety    8  Vitamin D deficiency  -     Vitamin D 25 hydroxy; Future           Subjective      Patient is here with her sister for follow-up appoint for chronic conditions and we reviewed recent labs  Patient complains of somewhat itchy rash on the ventral aspect of her right wrist for the past 1 week  She denies pain or burning or drainage  She has treated this with hydrocortisone cream without significant relief  Patient also complains of left shoulder pain for the past couple months  Patient is right-handed and denies any specific injury or fall  Otherwise patient has been feeling well overall and recently started walking regularly  Shoulder Pain   The pain is present in the left shoulder  This is a new problem  The current episode started more than 1 month ago  The problem occurs intermittently  The problem has been gradually improving  The quality of the pain is described as aching and sharp  Pertinent negatives include no inability to bear weight, joint locking, joint swelling, limited range of motion, numbness, stiffness or tingling  The symptoms are aggravated by activity  She has tried NSAIDS, cold and heat for the symptoms  The treatment provided mild relief  Thyroid Problem  Presents for follow-up visit  Symptoms include anxiety, dry skin and weight gain  Patient reports no cold intolerance, constipation, depressed mood, diaphoresis, diarrhea, fatigue, hair loss, heat intolerance, hoarse voice, leg swelling, palpitations, tremors, visual change or weight loss  The symptoms have been stable  Review of Systems   Constitutional: Positive for weight gain   Negative for "diaphoresis, fatigue and weight loss  HENT: Negative for hoarse voice  Cardiovascular: Negative for palpitations  Gastrointestinal: Negative for constipation and diarrhea  Endocrine: Negative for cold intolerance and heat intolerance  Musculoskeletal: Negative for stiffness  Neurological: Negative for tingling, tremors and numbness  Psychiatric/Behavioral: The patient is nervous/anxious          Current Outpatient Medications on File Prior to Visit   Medication Sig   • atorvastatin (LIPITOR) 10 mg tablet Take 1 tablet (10 mg total) by mouth daily   • Biotin 10 MG CAPS Take by mouth   • Cetirizine HCl 10 MG CAPS    • cholecalciferol (VITAMIN D3) 1,000 units tablet Take 1,000 Units by mouth daily   • Esomeprazole Magnesium 20 MG TBEC 2 tablets    • levothyroxine 50 mcg tablet TAKE ONE TABLET BY MOUTH EVERY DAY   • MAGNESIUM PO Take 150 mg by mouth   • Multiple Vitamins-Minerals (MULTIVITAMIN WOMENS 50+ ADV PO) Take 1 tablet by mouth daily   • [DISCONTINUED] ALPRAZolam (XANAX) 0 25 mg tablet Take 1 tablet (0 25 mg total) by mouth 2 (two) times a day as needed for anxiety   • [DISCONTINUED] sertraline (ZOLOFT) 50 mg tablet Take 1 tablet (50 mg total) by mouth daily   • [DISCONTINUED] polyethylene glycol (MIRALAX) 17 g packet  (Patient not taking: Reported on 5/23/2022)   • [DISCONTINUED] Psyllium (METAMUCIL FIBER PO)  (Patient not taking: Reported on 5/23/2022)   • [DISCONTINUED] sertraline (ZOLOFT) 50 mg tablet TAKE ONE TABLET BY MOUTH DAILY (Patient not taking: Reported on 5/16/2023)   • [DISCONTINUED] triamcinolone (KENALOG) 0 1 % ointment Apply topically 2 (two) times a day (Patient not taking: Reported on 5/16/2023)       Objective     /70 (BP Location: Left arm, Patient Position: Sitting, Cuff Size: Large)   Pulse 72   Temp 98 5 °F (36 9 °C) (Tympanic)   Resp 16   Ht 5' 1\" (1 549 m)   Wt 93 8 kg (206 lb 12 8 oz)   LMP  (LMP Unknown)   SpO2 95%   BMI 39 07 kg/m²     Physical " Exam  Constitutional:       General: She is not in acute distress  Appearance: Normal appearance  She is obese  HENT:      Head: Normocephalic  Mouth/Throat:      Mouth: Mucous membranes are moist    Eyes:      General: No scleral icterus  Conjunctiva/sclera: Conjunctivae normal    Neck:      Vascular: No carotid bruit  Cardiovascular:      Rate and Rhythm: Normal rate and regular rhythm  Pulmonary:      Effort: Pulmonary effort is normal       Breath sounds: Normal breath sounds  Abdominal:      Palpations: Abdomen is soft  Tenderness: There is no abdominal tenderness  Musculoskeletal:         General: Tenderness present  Cervical back: Neck supple  Right lower leg: No edema  Left lower leg: No edema  Comments: Left shoulder range of motion intact  Tenderness over anterior lateral and posterior aspects  Lymphadenopathy:      Cervical: No cervical adenopathy  Skin:     General: Skin is warm and dry  Findings: Rash present  Comments: Erythematous papular rash ventral aspect of right wrist    Neurological:      General: No focal deficit present  Mental Status: She is alert and oriented to person, place, and time  Psychiatric:         Mood and Affect: Mood normal          Behavior: Behavior normal          Thought Content:  Thought content normal          Judgment: Judgment normal        Dallin Castro DO

## 2023-05-18 ENCOUNTER — HOSPITAL ENCOUNTER (OUTPATIENT)
Dept: RADIOLOGY | Facility: HOSPITAL | Age: 53
End: 2023-05-18

## 2023-05-18 DIAGNOSIS — M25.512 ACUTE PAIN OF LEFT SHOULDER: ICD-10-CM

## 2023-05-18 DIAGNOSIS — E78.49 OTHER HYPERLIPIDEMIA: ICD-10-CM

## 2023-05-18 RX ORDER — ATORVASTATIN CALCIUM 10 MG/1
10 TABLET, FILM COATED ORAL DAILY
Qty: 90 TABLET | Refills: 0 | Status: SHIPPED | OUTPATIENT
Start: 2023-05-18

## 2023-05-31 NOTE — TELEPHONE ENCOUNTER
Pt called back and was advised  We discussed starting vaginal estrogen cream for atrophy. I described the benefits to vaginal health. We briefly discussed well publicized literature on risks of hormone replacement therapy. I described the low systemic absorption of vaginal estrogen. She will start vaginal estrogen. Please use 0.5g in the vagina 2-3 nights per week. Coupon and sample given.

## 2023-06-17 ENCOUNTER — APPOINTMENT (OUTPATIENT)
Dept: LAB | Facility: CLINIC | Age: 53
End: 2023-06-17
Payer: COMMERCIAL

## 2023-06-17 ENCOUNTER — HOSPITAL ENCOUNTER (OUTPATIENT)
Dept: MAMMOGRAPHY | Facility: HOSPITAL | Age: 53
Discharge: HOME | End: 2023-06-17
Payer: COMMERCIAL

## 2023-06-17 VITALS — WEIGHT: 206 LBS | BODY MASS INDEX: 38.89 KG/M2 | HEIGHT: 61 IN

## 2023-06-17 DIAGNOSIS — Z12.31 ENCOUNTER FOR SCREENING MAMMOGRAM FOR BREAST CANCER: ICD-10-CM

## 2023-06-17 DIAGNOSIS — E03.9 HYPOTHYROIDISM, UNSPECIFIED TYPE: ICD-10-CM

## 2023-06-17 DIAGNOSIS — R73.02 GLUCOSE INTOLERANCE (IMPAIRED GLUCOSE TOLERANCE): ICD-10-CM

## 2023-06-17 DIAGNOSIS — E78.49 OTHER HYPERLIPIDEMIA: ICD-10-CM

## 2023-06-17 DIAGNOSIS — E55.9 VITAMIN D DEFICIENCY: ICD-10-CM

## 2023-06-17 LAB
25(OH)D3 SERPL-MCNC: 51.2 NG/ML (ref 30–100)
ALBUMIN SERPL BCP-MCNC: 3.7 G/DL (ref 3.5–5)
ALP SERPL-CCNC: 99 U/L (ref 46–116)
ALT SERPL W P-5'-P-CCNC: 33 U/L (ref 12–78)
ANION GAP SERPL CALCULATED.3IONS-SCNC: 2 MMOL/L (ref 4–13)
AST SERPL W P-5'-P-CCNC: 24 U/L (ref 5–45)
BILIRUB SERPL-MCNC: 0.88 MG/DL (ref 0.2–1)
BILIRUB UR QL STRIP: NEGATIVE
BUN SERPL-MCNC: 16 MG/DL (ref 5–25)
CALCIUM SERPL-MCNC: 9.6 MG/DL (ref 8.3–10.1)
CHLORIDE SERPL-SCNC: 108 MMOL/L (ref 96–108)
CHOLEST SERPL-MCNC: 164 MG/DL
CLARITY UR: CLEAR
CO2 SERPL-SCNC: 28 MMOL/L (ref 21–32)
COLOR UR: NORMAL
CREAT SERPL-MCNC: 0.98 MG/DL (ref 0.6–1.3)
ERYTHROCYTE [DISTWIDTH] IN BLOOD BY AUTOMATED COUNT: 13.2 % (ref 11.6–15.1)
EST. AVERAGE GLUCOSE BLD GHB EST-MCNC: 151 MG/DL
GFR SERPL CREATININE-BSD FRML MDRD: 66 ML/MIN/1.73SQ M
GLUCOSE P FAST SERPL-MCNC: 110 MG/DL (ref 65–99)
GLUCOSE UR STRIP-MCNC: NEGATIVE MG/DL
HBA1C MFR BLD: 6.9 %
HCT VFR BLD AUTO: 38.9 % (ref 34.8–46.1)
HDLC SERPL-MCNC: 61 MG/DL
HGB BLD-MCNC: 12.3 G/DL (ref 11.5–15.4)
HGB UR QL STRIP.AUTO: NEGATIVE
KETONES UR STRIP-MCNC: NEGATIVE MG/DL
LDLC SERPL CALC-MCNC: 80 MG/DL (ref 0–100)
LEUKOCYTE ESTERASE UR QL STRIP: NEGATIVE
MCH RBC QN AUTO: 27.7 PG (ref 26.8–34.3)
MCHC RBC AUTO-ENTMCNC: 31.6 G/DL (ref 31.4–37.4)
MCV RBC AUTO: 88 FL (ref 82–98)
NITRITE UR QL STRIP: NEGATIVE
NONHDLC SERPL-MCNC: 103 MG/DL
PH UR STRIP.AUTO: 7.5 [PH]
PLATELET # BLD AUTO: 337 THOUSANDS/UL (ref 149–390)
PMV BLD AUTO: 9.9 FL (ref 8.9–12.7)
POTASSIUM SERPL-SCNC: 4.5 MMOL/L (ref 3.5–5.3)
PROT SERPL-MCNC: 7.5 G/DL (ref 6.4–8.4)
PROT UR STRIP-MCNC: NEGATIVE MG/DL
RBC # BLD AUTO: 4.44 MILLION/UL (ref 3.81–5.12)
SODIUM SERPL-SCNC: 138 MMOL/L (ref 135–147)
SP GR UR STRIP.AUTO: 1.01 (ref 1–1.03)
TRIGL SERPL-MCNC: 113 MG/DL
TSH SERPL DL<=0.05 MIU/L-ACNC: 3.84 UIU/ML (ref 0.45–4.5)
UROBILINOGEN UR STRIP-ACNC: <2 MG/DL
WBC # BLD AUTO: 6.98 THOUSAND/UL (ref 4.31–10.16)

## 2023-06-17 PROCEDURE — 85027 COMPLETE CBC AUTOMATED: CPT

## 2023-06-17 PROCEDURE — 80061 LIPID PANEL: CPT

## 2023-06-17 PROCEDURE — 83036 HEMOGLOBIN GLYCOSYLATED A1C: CPT

## 2023-06-17 PROCEDURE — 77067 SCR MAMMO BI INCL CAD: CPT

## 2023-06-17 PROCEDURE — 82306 VITAMIN D 25 HYDROXY: CPT

## 2023-06-17 PROCEDURE — 84443 ASSAY THYROID STIM HORMONE: CPT

## 2023-06-17 PROCEDURE — 77063 BREAST TOMOSYNTHESIS BI: CPT

## 2023-06-17 PROCEDURE — 80053 COMPREHEN METABOLIC PANEL: CPT

## 2023-06-17 PROCEDURE — 81003 URINALYSIS AUTO W/O SCOPE: CPT

## 2023-06-17 PROCEDURE — 36415 COLL VENOUS BLD VENIPUNCTURE: CPT

## 2023-06-19 PROBLEM — E11.9 TYPE 2 DIABETES MELLITUS WITHOUT COMPLICATION, WITHOUT LONG-TERM CURRENT USE OF INSULIN (HCC): Status: ACTIVE | Noted: 2023-06-19

## 2023-06-30 DIAGNOSIS — L30.9 ECZEMA, UNSPECIFIED TYPE: ICD-10-CM

## 2023-06-30 DIAGNOSIS — E03.9 HYPOTHYROIDISM, UNSPECIFIED TYPE: ICD-10-CM

## 2023-06-30 RX ORDER — LEVOTHYROXINE SODIUM 0.05 MG/1
50 TABLET ORAL DAILY
Qty: 90 TABLET | Refills: 3 | Status: SHIPPED | OUTPATIENT
Start: 2023-06-30

## 2023-07-13 ENCOUNTER — EVALUATION (OUTPATIENT)
Dept: PHYSICAL THERAPY | Facility: CLINIC | Age: 53
End: 2023-07-13
Payer: COMMERCIAL

## 2023-07-13 DIAGNOSIS — M25.512 ACUTE PAIN OF LEFT SHOULDER: Primary | ICD-10-CM

## 2023-07-13 PROCEDURE — 97162 PT EVAL MOD COMPLEX 30 MIN: CPT | Performed by: PHYSICAL THERAPIST

## 2023-07-13 PROCEDURE — 97110 THERAPEUTIC EXERCISES: CPT | Performed by: PHYSICAL THERAPIST

## 2023-07-13 PROCEDURE — 97112 NEUROMUSCULAR REEDUCATION: CPT | Performed by: PHYSICAL THERAPIST

## 2023-07-13 NOTE — PROGRESS NOTES
PT Evaluation     Today's date: 2023  Patient name: Miranda Kuo  : 1970  MRN: [id number]  Referring provider: DO Elaine Johnson:   Encounter Diagnosis     ICD-10-CM    1. Acute pain of left shoulder  M25.512                      Assessment  Assessment details: Miranda Kuo is a 46 y.o. female with a history of allergies, anxiety, asthma, thyroid disease, eczema, GERD, and obesity that presents for a moderate complexity physical therapy initial evaluation. The patient demonstrates signs and symptoms consistent with acute L shoulder pain; posterior cervical derangement. During the examination the patient demonstrated decreased L UE strength, decreased L shoulder/cervical ROM, activity intolerance, and L shoulder pain. The patient's impairments are causing the following functional limitations: Difficulty lifting/carrying objects heavier than 10 pounds, difficulty reaching behind back, difficulty reaching above head, and difficulty donning/doffing a shirt. The patient's clinical presentation is evolving due to a number of participation restrictions, significant medial history, and functional limitation (FOTO 53% function). The patient will benefit from skilled PT services to address impairments, work towards goals, and restore PLOF. Impairments: abnormal or restricted ROM, activity intolerance, impaired physical strength, lacks appropriate home exercise program, pain with function and poor posture   Functional limitations: Difficulty lifting/carrying objects heavier than 10 pounds, difficulty reaching behind back, difficulty reaching above head, and difficulty donning/doffing a shirt  Symptom irritability: moderateUnderstanding of Dx/Px/POC: good   Prognosis: good    Goals  STG: ACHIEVE in 4 -6 weeks  1.   Improve L shoulder pain at worst by 50% to improve ADL's.  2.  Patient's cervical ROM improve to "minimal restriction" all motions tested to allow for function ROM with ADL's and driving. 3.  Patient's shoulder/cervical MMT improve by 1/2-1 muscle grade to allow for completion of over head activities without difficulty. LTG:  Achieve in 8-12 weeks  1. Patient return to all activities without pain greater than 0-1/10 in L shoulder to achieve their personal goal.  2.  Patient's L shoulder strength return to equal for both sides to lift/carry items without pain/difficulty. 3.  Patient's L shoulder ROM improve to WNL to perform all ADL's and overhead tasks without difficulty. 4.  Patient to be independent with home exercise plan at time of discharge. 5. Patient's shoulder FOTO score improve to > 70% to indicate a return to normal function. Plan  Plan details: Re-assess 1x/month  Patient would benefit from: skilled physical therapy  Planned modality interventions: TENS, cryotherapy, thermotherapy: hydrocollator packs and traction  Other planned modality interventions: [facility]  Planned therapy interventions: joint mobilization, manual therapy, massage, neuromuscular re-education, patient education, postural training, self care, strengthening, stretching, therapeutic activities, therapeutic exercise, home exercise program and abdominal trunk stabilization  Frequency: 1-3x/wk. Duration in weeks: 12  Plan of Care beginning date: 7/13/2023  Plan of Care expiration date: 10/13/2023  Treatment plan discussed with: PTA and patient        Subjective Evaluation    History of Present Illness  Date of onset: 6/13/2023  Mechanism of injury: Shana Cranker is a 46 y.o. female that presents to outpatient physical therapy with complaints of L shoulder pain. The patient reports insidious onset of L shoulder pain and difficulty lying on her L shoulder. The patient notes difficulty with lifting heavy activities. The patient notes difficulty with reaching behind her back and weight bearing through the L arm.   The patient notes onset of intermittent tingling at her L hand( median N. Distribution) with the onset of L shoulder pain. The patient's main goal for physical therapy is to get rid of pain at her L shoulder. L shoulder X-ray: FINDINGS:  Darryl Knock is no acute fracture or dislocation.   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   No lytic or blastic osseous lesion.   Several calcifications along the superior and lateral humeral head which may be indicative of calcific tendinitis.     IMPRESSION:   No acute osseous abnormality.   Calcific tendinitis.   Workstation performed: [id number]          Not a recurrent problem   Patient Goals  Patient goals for therapy: decreased pain, improved balance, increased motion, increased strength, independence with ADLs/IADLs and return to sport/leisure activities  Patient goal: decrease shoulder pain  Pain  Current pain ratin  At best pain rating: 3  At worst pain ratin  Location: L shoulder to L elbow  Quality: dull ache  Aggravating factors: lifting and overhead activity  Progression: no change    Social Support  Lives in: multiple-level home  Lives with: significant other (sister)    Employment status: working ([employer])  Hand dominance: ambidextrous    Treatments  Current treatment: physical therapy        Objective     Palpation   Left   Tenderness of the teres minor. Tenderness     Left Shoulder   Tenderness in the acromion, infraspinatus tendon and lateral scapula.      Cervical/Thoracic Screen   Cervical range of motion within normal limits with the following exceptions: Cervical:  Protrusion: NIL loss  Retraction: HOWARD loss  Flexion: MIN loss  EXT: HOWARD loss  ROT: L: 55 deg loss R: 55 deg loss  Lat Flex: L:mod loss R: mod loss      Neurological Testing     Sensation     Shoulder   Left Shoulder   Intact: light touch    Right Shoulder   Intact: light touch    Reflexes   Left   Biceps (C5/C6): normal (2+)  Brachioradialis (C6): brisk (3+)  Delgadillo's reflex: negative    Right   Biceps (C5/C6): normal (2+)  Brachioradialis (C6): brisk (3+)  Delgadillo's reflex: negative    Active Range of Motion   Left Shoulder   Flexion: 120 degrees with pain  Extension: 20 degrees with pain  Abduction: 135 degrees with pain  Adduction: 15 degrees with pain  External rotation 0°: 70 degrees   External rotation BTH: T2   Internal rotation BTB: T8     Right Shoulder   Flexion: 170 degrees   Extension: 72 degrees   Abduction: 165 degrees   Adduction: 35 degrees   External rotation 0°: 60 degrees   External rotation BTH: T2   Internal rotation BTB: T8     Strength/Myotome Testing     Left Shoulder     Planes of Motion   Flexion: 4   Abduction: 4   External rotation at 0°: 4-   Internal rotation at 0°: 5     Right Shoulder     Planes of Motion   Flexion: 5   Abduction: 5   External rotation at 0°: 5   Internal rotation at 0°: 5     Tests     Additional Tests Details  FOTO: 53% ( predicted 66%)    PATIENT"S L SHOULDER ROM AND STRENGTH IMPROVED AFTER PERFORMING REPEATED CERVICAL RETRACTIONS AND EXTENSIONS               Precautions: anxiety    Re-evaluation: 8/10  BASELINE:  LIMITED L SHOULDER FLEX/ABD ROM AND STRENGTH  Shoulder Specialty Daily Treatment Diary     Manual  7/13       STM/MFR shoulder Justin Cruz  /stretching                GH jt mobs        Pec Minor stretch                    Therapeutic Exercise 7/13       UBE for muscle endurance *       Pulleys        Shoulder isometrics (flex, ext, abd, IR, ER)        TB sh. IR, ER        Table slides flex/abd/ER        T-spine extension *       PROM shoulder        Shoulder Cane EXT X10       Shoulder IR stretch        Cane AAROM        Prone rows / extension / horizontal abduction                                Upper Trap Stretch        Levator Scap Stretch        SCM stretch        Neuro Re-ed        Chin Tucks 2X10  1X10 W/ CLIN O.P.       CHIN TUCK with EXT 2x10       Cool's loop ER elevation w/ mirror *       MTP/LTP *       YTI wall slide with lift off *Y       PNF D1/D2        Towel roll education / posture education Done AD       Posture correction *       Scapular retraction w/ ER *       Therapeutic Activity        Crate carry        3 height crate lifts                Cones in Shelf            Modalities        CP shoulder

## 2023-07-17 ENCOUNTER — OFFICE VISIT (OUTPATIENT)
Dept: PHYSICAL THERAPY | Facility: CLINIC | Age: 53
End: 2023-07-17
Payer: COMMERCIAL

## 2023-07-17 DIAGNOSIS — M25.512 ACUTE PAIN OF LEFT SHOULDER: Primary | ICD-10-CM

## 2023-07-17 PROCEDURE — 97110 THERAPEUTIC EXERCISES: CPT | Performed by: PHYSICAL THERAPIST

## 2023-07-17 PROCEDURE — 97112 NEUROMUSCULAR REEDUCATION: CPT | Performed by: PHYSICAL THERAPIST

## 2023-07-17 NOTE — PROGRESS NOTES
Daily Note     Today's date: 2023  Patient name: Dyllan Callaway  : 1970  MRN: [id number]  Referring provider: Lilian River DO  Dx:   Encounter Diagnosis     ICD-10-CM    1. Acute pain of left shoulder  M25.512                      Subjective: No pain presently - mainly now when sleeping      Objective: See treatment diary below      Assessment: The patient tolerated all activities well today. The patient needed verbal and manual cues for proper posture and technique to perform the exercises properly. There were no complaints of increased pain or problems after the session today. The patient will benefit from continued skilled physical therapy to progress towards achieving patient centered goals. Plan: Continue per plan of care. Progress treatment as tolerated.        Precautions: anxiety    Re-evaluation: 8/10  BASELINE:  LIMITED L SHOULDER FLEX/ABD ROM AND STRENGTH  CERVICAL RETRACTION AND EXT RESPONDER    Shoulder Specialty Daily Treatment Diary   [address]    Manual        STM/MFR shoulder Charlene Stagger  /stretching                GH jt mobs        Pec Minor stretch                    Therapeutic Exercise       UBE for muscle endurance * 120/20 x 4 mins ALT      Pulleys        Shoulder isometrics (flex, ext, abd, IR, ER)        TB sh. IR, ER  *      Table slides flex/abd/ER        T-spine extension * Towel roll x10              Shoulder Cane EXT X10       Shoulder IR stretch                Prone rows / extension / horizontal abduction                                Upper Trap Stretch        Levator Scap Stretch        SCM stretch        Neuro Re-ed        Chin Tucks 2X10  1X10 W/ CLIN O.P. 1x10      CHIN TUCK with EXT 2x10 2x10      Cool's loop ER elevation w/ mirror *       MTP/LTP * *      YTI wall slide with lift off *Y Attempted without success      PNF D1/D2  * mirror      Towel roll education / posture education Done AD DONE AD sleep positioning      Posture correction * x10      Scapular retraction w/ ER * x10      Therapeutic Activity        Crate carry        3 height crate lifts                Cones in Shelf            Modalities        CP shoulder

## 2023-07-20 ENCOUNTER — OFFICE VISIT (OUTPATIENT)
Dept: PHYSICAL THERAPY | Facility: CLINIC | Age: 53
End: 2023-07-20
Payer: COMMERCIAL

## 2023-07-20 DIAGNOSIS — M25.512 ACUTE PAIN OF LEFT SHOULDER: Primary | ICD-10-CM

## 2023-07-20 PROCEDURE — 97112 NEUROMUSCULAR REEDUCATION: CPT | Performed by: PHYSICAL THERAPIST

## 2023-07-20 PROCEDURE — 97110 THERAPEUTIC EXERCISES: CPT | Performed by: PHYSICAL THERAPIST

## 2023-07-20 NOTE — PROGRESS NOTES
Daily Note     Today's date: 2023  Patient name: Emelia Siemens  : 1970  MRN: [id number]  Referring provider: Jose Rawls DO  Dx:   Encounter Diagnosis     ICD-10-CM    1. Acute pain of left shoulder  M25.512           Start Time: 1700  Stop Time: 1740  Total time in clinic (min): 40 minutes    Subjective: Patient states she attempted to use towel roll under arm to help with sleeping, but did not help. Has been working on exercises at home, but has difficulty with correct form. Objective: See treatment diary below    Assessment: Tolerated treatment well. Noted difficulty with chin retraction without compensatory movements as well as limited scapular awareness and need for continual verbal, tactile and visual cueing during scapular retraction exercises. Discussed sleeping postures and was given a few additional postures to try to help decrease night pain. Patient would benefit from continued PT working on postural stability and scapular control. Plan: Continue per plan of care. Progress treatment as tolerated.        Precautions: anxiety    Re-evaluation: 8/10  BASELINE:  LIMITED L SHOULDER FLEX/ABD ROM AND STRENGTH  CERVICAL RETRACTION AND EXT RESPONDER    Shoulder Specialty Daily Treatment Diary   [id number]    Manual       STM/MFR shoulder Merribeth Olman  /stretching                GH jt mobs        Pec Minor stretch                    Therapeutic Exercise      UBE for muscle endurance * 120/20 x 4 mins /20 x 4 mins ALT     Pulleys        Shoulder isometrics (flex, ext, abd, IR, ER)        TB sh. IR, ER  * Red 10x bilat     Table slides flex/abd/ER        T-spine extension * Towel roll x10 Towel roll x10             Shoulder Cane EXT X10  10x with scapular retraction and mirror     Shoulder IR stretch                Prone rows / extension / horizontal abduction                                Upper Trap Stretch        Levator Scap Stretch        SCM stretch Neuro Re-ed        Chin Tucks 2X10  1X10 W/ CLIN O.P. 1x10 1x10     CHIN TUCK with EXT 2x10 2x10 2x10 with intermittent scap retraction     Cool's loop ER elevation w/ mirror *       MTP/LTP * * Red MTP 2x10     YTI wall slide with lift off *Y Attempted without success      PNF D1/D2  * mirror      Towel roll education / posture education Done AD DONE AD sleep positioning      Posture correction * x10 :10x10     Scapular retraction w/ ER * x10 10x mirror     Prone scapular retraction    10x     Therapeutic Activity        Crate carry        3 height crate lifts                Cones in Shelf            Modalities        CP shoulder

## 2023-07-31 ENCOUNTER — OFFICE VISIT (OUTPATIENT)
Dept: PHYSICAL THERAPY | Facility: CLINIC | Age: 53
End: 2023-07-31
Payer: COMMERCIAL

## 2023-07-31 DIAGNOSIS — M25.512 ACUTE PAIN OF LEFT SHOULDER: Primary | ICD-10-CM

## 2023-07-31 PROCEDURE — 97110 THERAPEUTIC EXERCISES: CPT

## 2023-07-31 PROCEDURE — 97112 NEUROMUSCULAR REEDUCATION: CPT

## 2023-07-31 NOTE — PROGRESS NOTES
Daily Note     Today's date: 2023  Patient name: Dustin Massey  : 1970  MRN: [id number]  Referring provider: Yanely Bower DO  Dx:   Encounter Diagnosis     ICD-10-CM    1. Acute pain of left shoulder  M25.512           Start Time: 165          Subjective: Patient states sleeping is the worst for her pain. Nothing has been helping her to become comfortable for the whole night. She rates her pain a 1-2/10 in the shoulder now. She feels the exercises are going well at home. Objective: See treatment diary below    Patient's home exercise program updated to include additional exercises for the cervical area. Handout issued and explained with demonstration. Patient accepted new exercises. Assessment: Tolerated treatment well. Patient would benefit from continued PT for stretching and strengtheing. Patient was able to add exercises to her program with little difficulty and discomfort. She felt tight during cervical stretches. Patient seemed to understand all education on new exercises. Patient felt ok by the end of the session. Plan: Continue per plan of care. Progress treatment as tolerated.        Precautions: anxiety    Re-evaluation: 8/10  BASELINE:  LIMITED L SHOULDER FLEX/ABD ROM AND STRENGTH  CERVICAL RETRACTION AND EXT RESPONDER    Shoulder Specialty Daily Treatment Diary   [id number]    Manual      STM/MFR shoulder Kennedy Johnathan  /stretching                GH jt mobs        Pec Minor stretch                    Therapeutic Exercise     UBE for muscle endurance * 120/20 x 4 mins /20 x 4 mins /20 x4 min alt    Pulleys        Shoulder isometrics (flex, ext, abd, IR, ER)        TB sh. IR, ER  * Red 10x bilat red x10 ea B/L     Table slides flex/abd/ER        T-spine extension * Towel roll x10 Towel roll x10 Towel roll x10            Shoulder Cane EXT X10  10x with scapular retraction and mirror Mirror x10    Shoulder IR stretch Prone rows / extension / horizontal abduction                                Upper Trap Stretch    :30x3    Levator Scap Stretch    :30x3    SCM stretch    :30x3    Neuro Re-ed        Chin Tucks 2X10  1X10 W/ CLIN O.P. 1x10 1x10 x10    CHIN TUCK with EXT 2x10 2x10 2x10 with intermittent scap retraction 2x10 intermittent scap retraction (x10 w/towel)    Cool's loop ER elevation w/ mirror *       MTP/LTP * * Red MTP 2x10 Red MTP x20  LTP x10    YTI wall slide with lift off *Y Attempted without success      PNF D1/D2  * mirror      Towel roll education / posture education Done AD DONE AD sleep positioning      Posture correction * x10 :10x10 :10x10    Scapular retraction w/ ER * x10 10x mirror Mirror x10    Prone scapular retraction    10x x10    Therapeutic Activity        Crate carry        3 height crate lifts                Cones in Shelf            Modalities        CP shoulder                          Access Code: [email or web address]  URL: [email or web address]  Date: 07/31/2023  Prepared by: Linh Eastman    Exercises  - Seated Cervical Retraction  - 6 x daily - 7 x weekly - 1 sets - 10 reps - 5 hold  - Seated Cervical Retraction and Extension  - 6 x daily - 7 x weekly - 1 sets - 10 reps  - Seated Thoracic Extension AROM  - 3 x daily - 7 x weekly - 1 sets - 10 reps - 3 hold  - Shoulder External Rotation and Scapular Retraction  - 2 x daily - 7 x weekly - 2 sets - 10 reps - 5 hold  - Seated Upright Posture Correction  - 2-3 x daily - 7 x weekly - 1 sets - 10 reps - 5-10 hold  - Standing Shoulder Extension ROM with Dowel  - 1 x daily - 7 x weekly - 3 sets - 10 reps  - Seated Upper Trapezius Stretch  - 2 x daily - 7 x weekly - 1 sets - 3 reps - 30 sec hold  - Gentle Levator Scapulae Stretch  - 2 x daily - 7 x weekly - 1 sets - 3 reps - 30 sec hold  - Seated Scalenes Stretch  - 2 x daily - 7 x weekly - 1 sets - 3 reps - 30 sec hold    Patient Education  - Office Posture

## 2023-08-03 ENCOUNTER — OFFICE VISIT (OUTPATIENT)
Dept: PHYSICAL THERAPY | Facility: CLINIC | Age: 53
End: 2023-08-03
Payer: COMMERCIAL

## 2023-08-03 DIAGNOSIS — M25.512 ACUTE PAIN OF LEFT SHOULDER: Primary | ICD-10-CM

## 2023-08-03 PROCEDURE — 97110 THERAPEUTIC EXERCISES: CPT | Performed by: PHYSICAL THERAPIST

## 2023-08-03 PROCEDURE — 97112 NEUROMUSCULAR REEDUCATION: CPT | Performed by: PHYSICAL THERAPIST

## 2023-08-03 NOTE — PROGRESS NOTES
Daily Note     Today's date: 8/3/2023  Patient name: David Cowart  : 1970  MRN: [id number]  Referring provider: Stanton Steele DO  Dx:   Encounter Diagnosis     ICD-10-CM    1. Acute pain of left shoulder  M25.512                      Subjective: The patient notes she is feeling better with less pain. The patient notes feeling 1-2/10 pain with reaching back and out to the side. She is not having pain        Objective: See treatment diary below      Assessment: The patient tolerated all activities well today. The patient utilized verbal, visual,and manual cues for proper posture and technique to perform the exercises properly. There were mild complaints of L shoulder soreness after the completion of the patient's exercises today. The patient will benefit from continued skilled physical therapy to progress towards achieving patient centered goals. Plan: Continue per plan of care. Progress treatment as tolerated.        Precautions: anxiety    Re-evaluation: 8/10  BASELINE:  LIMITED L SHOULDER FLEX/ABD ROM AND STRENGTH  CERVICAL RETRACTION AND EXT RESPONDER    Shoulder Specialty Daily Treatment Diary   [facility]    Manual   8/3   STM/MFR shoulder Bert Medici  /stretching                GH jt mobs        Pec Minor stretch                    Therapeutic Exercise  8/3   UBE for muscle endurance * 120/20 x 4 mins /20 x 4 mins /20 x4 min alt 100/70 x 6 mins   Pulleys        Shoulder isometrics (flex, ext, abd, IR, ER)        TB sh. IR, ER  * Red 10x bilat red x10 ea B/L  GRN x15 ea   Table slides flex/abd/ER        T-spine extension * Towel roll x10 Towel roll x10 Towel roll x10 1/2 roll x10           Shoulder Cane EXT X10  10x with scapular retraction and mirror Mirror x10 Mirror x10   Shoulder IR stretch                Prone rows / extension / horizontal abduction                                Upper Trap Stretch    :30x3 :30x3   Levator Scap Stretch    :30x3    SCM stretch    :30x3    Neuro Re-ed        Chin Tucks 2X10  1X10 W/ CLIN O.P. 1x10 1x10 x10 x10   CHIN TUCK with EXT 2x10 2x10 2x10 with intermittent scap retraction 2x10 intermittent scap retraction (x10 w/towel) 2x10  Last 2-3 reps head shake O.P.   Cool's loop ER elevation w/ mirror *       MTP/LTP * * Red MTP 2x10 Red MTP x20  LTP x10 RED x20 ea   YTI wall slide with lift off *Y Attempted without success      PNF D1/D2  * mirror      Towel roll education / posture education Done AD DONE AD sleep positioning      Posture correction * x10 :10x10 :10x10 10x:10   Scapular retraction w/ ER * x10 10x mirror Mirror x10 Mirror x10   Prone scapular retraction    10x x10    Therapeutic Activity        Crate carry        3 height crate lifts                Cones in Shelf            Modalities        CP shoulder                          Access Code: [email or web address]  URL: [email or web address]  Date: 07/31/2023  Prepared by: Reddy Eastman    Exercises  - Seated Cervical Retraction  - 6 x daily - 7 x weekly - 1 sets - 10 reps - 5 hold  - Seated Cervical Retraction and Extension  - 6 x daily - 7 x weekly - 1 sets - 10 reps  - Seated Thoracic Extension AROM  - 3 x daily - 7 x weekly - 1 sets - 10 reps - 3 hold  - Shoulder External Rotation and Scapular Retraction  - 2 x daily - 7 x weekly - 2 sets - 10 reps - 5 hold  - Seated Upright Posture Correction  - 2-3 x daily - 7 x weekly - 1 sets - 10 reps - 5-10 hold  - Standing Shoulder Extension ROM with Dowel  - 1 x daily - 7 x weekly - 3 sets - 10 reps  - Seated Upper Trapezius Stretch  - 2 x daily - 7 x weekly - 1 sets - 3 reps - 30 sec hold  - Gentle Levator Scapulae Stretch  - 2 x daily - 7 x weekly - 1 sets - 3 reps - 30 sec hold  - Seated Scalenes Stretch  - 2 x daily - 7 x weekly - 1 sets - 3 reps - 30 sec hold    Patient Education  - Office Posture

## 2023-08-07 ENCOUNTER — OFFICE VISIT (OUTPATIENT)
Dept: PHYSICAL THERAPY | Facility: CLINIC | Age: 53
End: 2023-08-07
Payer: COMMERCIAL

## 2023-08-07 DIAGNOSIS — M25.512 ACUTE PAIN OF LEFT SHOULDER: Primary | ICD-10-CM

## 2023-08-07 PROCEDURE — 97112 NEUROMUSCULAR REEDUCATION: CPT

## 2023-08-07 PROCEDURE — 97110 THERAPEUTIC EXERCISES: CPT

## 2023-08-07 NOTE — PROGRESS NOTES
Daily Note     Today's date: 2023  Patient name: Raquel Agee  : 1970  MRN: [id number]  Referring provider: Willi Hedrick DO  Dx:   Encounter Diagnosis     ICD-10-CM    1. Acute pain of left shoulder  M25.512                      Subjective: Patient reports rolling onto her sh while sleeping last night with pain, but subsided as the day progressed. This p.m. c/o min upper L arm pain. Indicates the cerv retraction and ext exercise is helping. Objective: See treatment diary below      Assessment: Tolerated treatment well. Good performance overall without complaints or without increased discomfort. Patient would benefit from continued PT      Plan: Continue per plan of care.       Precautions: anxiety    Re-evaluation: 8/10  BASELINE:  LIMITED L SHOULDER FLEX/ABD ROM AND STRENGTH  CERVICAL RETRACTION AND EXT RESPONDER    Shoulder Specialty Daily Treatment Diary   [facility]    Manual  7/13 7/17 7/20 7/31 8/3   STM/MFR shoulder Marvia Pata  /stretching                GH jt mobs        Pec Minor stretch                    Therapeutic Exercise 7/13 7/17 7/20 7/31 8/3 8/7   UBE for muscle endurance * 120/20 x 4 mins /20 x 4 mins /20 x4 min alt 100/70 x 6 mins 3'3'   Pulleys         Shoulder isometrics (flex, ext, abd, IR, ER)         TB sh. IR, ER  * Red 10x bilat red x10 ea B/L  GRN x15 ea GRN x15 ea   Table slides flex/abd/ER         T-spine extension * Towel roll x10 Towel roll x10 Towel roll x10 1/2 roll x10 1/2 roll x10            Shoulder Cane EXT X10  10x with scapular retraction and mirror Mirror x10 Mirror x10 Mirror x10   Shoulder IR stretch                  Prone rows / extension / horizontal abduction                                    Upper Trap Stretch    :30x3 :30x3 :30x3   Levator Scap Stretch    :30x3  :30x3   SCM stretch    :30x3     Neuro Re-ed         Chin Tucks 2X10  1X10 W/ CLIN O.P. 1x10 1x10 x10 x10 x10   CHIN TUCK with EXT 2x10 2x10 2x10 with intermittent scap retraction 2x10 intermittent scap retraction (x10 w/towel) 2x10  Last 2-3 reps head shake O.P. 2 x 10 Last 2-3 reps head shake O.P.   Cool's loop ER elevation w/ mirror *        MTP/LTP * * Red MTP 2x10 Red MTP x20  LTP x10 RED x20 ea RED x20 ea   YTI wall slide with lift off *Y Attempted without success       PNF D1/D2  * mirror       Towel roll education / posture education Done AD DONE AD sleep positioning       Posture correction * x10 :10x10 :10x10 10x:10    Scapular retraction w/ ER * x10 10x mirror Mirror x10 Mirror x10 Mirror x 10   Prone scapular retraction    10x x10     Therapeutic Activity         Crate carry         3 height crate lifts                  Cones in Shelf             Modalities        CP shoulder                          Access Code: [email or web address]  URL: [email or web address]  Date: 07/31/2023  Prepared by: Linh Eastman    Exercises  - Seated Cervical Retraction  - 6 x daily - 7 x weekly - 1 sets - 10 reps - 5 hold  - Seated Cervical Retraction and Extension  - 6 x daily - 7 x weekly - 1 sets - 10 reps  - Seated Thoracic Extension AROM  - 3 x daily - 7 x weekly - 1 sets - 10 reps - 3 hold  - Shoulder External Rotation and Scapular Retraction  - 2 x daily - 7 x weekly - 2 sets - 10 reps - 5 hold  - Seated Upright Posture Correction  - 2-3 x daily - 7 x weekly - 1 sets - 10 reps - 5-10 hold  - Standing Shoulder Extension ROM with Dowel  - 1 x daily - 7 x weekly - 3 sets - 10 reps  - Seated Upper Trapezius Stretch  - 2 x daily - 7 x weekly - 1 sets - 3 reps - 30 sec hold  - Gentle Levator Scapulae Stretch  - 2 x daily - 7 x weekly - 1 sets - 3 reps - 30 sec hold  - Seated Scalenes Stretch  - 2 x daily - 7 x weekly - 1 sets - 3 reps - 30 sec hold    Patient Education  - Office Posture

## 2023-08-10 ENCOUNTER — EVALUATION (OUTPATIENT)
Dept: PHYSICAL THERAPY | Facility: CLINIC | Age: 53
End: 2023-08-10
Payer: COMMERCIAL

## 2023-08-10 DIAGNOSIS — M25.512 ACUTE PAIN OF LEFT SHOULDER: Primary | ICD-10-CM

## 2023-08-10 PROCEDURE — 97112 NEUROMUSCULAR REEDUCATION: CPT | Performed by: PHYSICAL THERAPIST

## 2023-08-10 PROCEDURE — 97110 THERAPEUTIC EXERCISES: CPT | Performed by: PHYSICAL THERAPIST

## 2023-08-24 DIAGNOSIS — E78.49 OTHER HYPERLIPIDEMIA: ICD-10-CM

## 2023-08-24 RX ORDER — ATORVASTATIN CALCIUM 10 MG/1
10 TABLET, FILM COATED ORAL DAILY
Qty: 90 TABLET | Refills: 0 | Status: SHIPPED | OUTPATIENT
Start: 2023-08-24

## 2023-08-29 ENCOUNTER — OFFICE VISIT (OUTPATIENT)
Dept: URGENT CARE | Facility: CLINIC | Age: 53
End: 2023-08-29
Payer: COMMERCIAL

## 2023-08-29 VITALS
TEMPERATURE: 98.1 F | SYSTOLIC BLOOD PRESSURE: 140 MMHG | WEIGHT: 205 LBS | DIASTOLIC BLOOD PRESSURE: 74 MMHG | BODY MASS INDEX: 38.71 KG/M2 | HEIGHT: 61 IN | HEART RATE: 84 BPM | RESPIRATION RATE: 18 BRPM | OXYGEN SATURATION: 98 %

## 2023-08-29 DIAGNOSIS — J06.9 UPPER RESPIRATORY TRACT INFECTION, UNSPECIFIED TYPE: Primary | ICD-10-CM

## 2023-08-29 LAB
SARS-COV-2 AG UPPER RESP QL IA: NEGATIVE
VALID CONTROL: NORMAL

## 2023-08-29 PROCEDURE — 99213 OFFICE O/P EST LOW 20 MIN: CPT | Performed by: NURSE PRACTITIONER

## 2023-08-29 PROCEDURE — 87811 SARS-COV-2 COVID19 W/OPTIC: CPT | Performed by: NURSE PRACTITIONER

## 2023-08-29 RX ORDER — BROMPHENIRAMINE MALEATE, PSEUDOEPHEDRINE HYDROCHLORIDE, AND DEXTROMETHORPHAN HYDROBROMIDE 2; 30; 10 MG/5ML; MG/5ML; MG/5ML
5 SYRUP ORAL 4 TIMES DAILY PRN
Qty: 120 ML | Refills: 0 | Status: SHIPPED | OUTPATIENT
Start: 2023-08-29

## 2023-08-29 NOTE — PROGRESS NOTES
[facility]        NAME: Dennis Costello is a 46 y.o. female  : 1970    MRN: [id number]  DATE: 2023  TIME: 6:13 PM    Assessment and Plan   Upper respiratory tract infection, unspecified type [J06.9]  1. Upper respiratory tract infection, unspecified type  Poct Covid 19 Rapid Antigen Test    brompheniramine-pseudoephedrine-DM 30-2-10 MG/5ML syrup        covid test negative    Patient Instructions     Patient Instructions   Take medication as directed. Rest and drink plenty of fluids. A cool mist humidifier and saline rinse can be helpful. If you develop a worsening cough, chest pain, shortness of breath, palpitations, coughing up blood, prolonged high fever, severe headache, dizziness, any new or concerning symptoms please return or proceed ER. Recommend following up with PCP in 3-5 days            Chief Complaint     Chief Complaint   Patient presents with   • Sinusitis     Started yesterday with cough, post nasal drip / runny nose. History of Present Illness       Sinusitis  This is a new problem. The current episode started yesterday. The problem is unchanged. There has been no fever. The pain is moderate. Associated symptoms include congestion, coughing, sinus pressure and a sore throat. Pertinent negatives include no chills, diaphoresis, ear pain, headaches, neck pain, shortness of breath or swollen glands. Past treatments include nothing. The treatment provided no relief. Review of Systems   Review of Systems   Constitutional: Negative for chills, diaphoresis, fatigue and fever. HENT: Positive for congestion, postnasal drip, rhinorrhea, sinus pressure, sinus pain and sore throat. Negative for ear pain, facial swelling, tinnitus and trouble swallowing. Eyes: Negative. Respiratory: Positive for cough. Negative for chest tightness, shortness of breath, wheezing and stridor. Cardiovascular: Negative for chest pain and palpitations.    Gastrointestinal: Negative. Musculoskeletal: Negative for arthralgias, back pain, joint swelling, myalgias, neck pain and neck stiffness. Neurological: Negative for dizziness, facial asymmetry, weakness, light-headedness, numbness and headaches.          Current Medications       Current Outpatient Medications:   •  ALPRAZolam (XANAX) 0.25 mg tablet, Take 1 tablet (0.25 mg total) by mouth 2 (two) times a day as needed for anxiety, Disp: 30 tablet, Rfl: 0  •  atorvastatin (LIPITOR) 10 mg tablet, Take 1 tablet (10 mg total) by mouth daily, Disp: 90 tablet, Rfl: 0  •  brompheniramine-pseudoephedrine-DM 30-2-10 MG/5ML syrup, Take 5 mL by mouth 4 (four) times a day as needed for congestion or cough, Disp: 120 mL, Rfl: 0  •  Cetirizine HCl 10 MG CAPS, , Disp: , Rfl:   •  cholecalciferol (VITAMIN D3) 1,000 units tablet, Take 1,000 Units by mouth daily, Disp: , Rfl:   •  Esomeprazole Magnesium 20 MG TBEC, 2 tablets , Disp: , Rfl:   •  levothyroxine 50 mcg tablet, Take 1 tablet (50 mcg total) by mouth daily, Disp: 90 tablet, Rfl: 3  •  MAGNESIUM PO, Take 150 mg by mouth, Disp: , Rfl:   •  Multiple Vitamins-Minerals (MULTIVITAMIN WOMENS 50+ ADV PO), Take 1 tablet by mouth daily, Disp: , Rfl:   •  sertraline (ZOLOFT) 50 mg tablet, Take 1 tablet (50 mg total) by mouth daily, Disp: 90 tablet, Rfl: 3  •  triamcinolone (KENALOG) 0.1 % ointment, Apply topically 2 (two) times a day, Disp: 30 g, Rfl: 0  •  Biotin 10 MG CAPS, Take by mouth (Patient not taking: Reported on 8/29/2023), Disp: , Rfl:   •  meloxicam (MOBIC) 15 mg tablet, Take 1 tablet (15 mg total) by mouth daily as needed for moderate pain (Patient not taking: Reported on 8/29/2023), Disp: 30 tablet, Rfl: 1    Current Allergies     Allergies as of 08/29/2023   • (No Known Allergies)            The following portions of the patient's history were reviewed and updated as appropriate: allergies, current medications, past family history, past medical history, past social history, past surgical history and problem list.     Past Medical History:   Diagnosis Date   • Allergic     Since I was a child   • Anxiety    • Asthma    • Disease of thyroid gland    • Eczema    • GERD (gastroesophageal reflux disease)    • Obesity     Since I was a child       Past Surgical History:   Procedure Laterality Date   • CHOLECYSTECTOMY     • DILATION AND CURETTAGE OF UTERUS     • WISDOM TOOTH EXTRACTION         Family History   Problem Relation Age of Onset   • Lymphoma Mother    • Liver disease Mother    • Dementia Mother    • Heart disease Mother    • Thyroid disease Mother    • Cancer Mother    • Other Father    • Diabetes Father    • Stroke Father    • Hypertension Father    • Heart disease Father    • Hypertension Sister    • No Known Problems Maternal Grandmother    • Leukemia Maternal Grandfather    • No Known Problems Paternal Grandmother    • No Known Problems Paternal Grandfather    • No Known Problems Paternal Aunt    • No Known Problems Paternal Aunt          Medications have been verified. Objective   /74   Pulse 84   Temp 98.1 °F (36.7 °C)   Resp 18   Ht 5' 1" (1.549 m)   Wt 93 kg (205 lb)   LMP  (LMP Unknown)   SpO2 98%   BMI 38.73 kg/m²   No LMP recorded (lmp unknown). Patient is postmenopausal.       Physical Exam     Physical Exam  Constitutional:       General: She is not in acute distress. Appearance: She is well-developed. She is not diaphoretic. HENT:      Head: Normocephalic and atraumatic. Right Ear: Hearing, tympanic membrane, ear canal and external ear normal.      Left Ear: Hearing, tympanic membrane, ear canal and external ear normal.      Nose: Mucosal edema, congestion and rhinorrhea present. Right Sinus: Maxillary sinus tenderness present. No frontal sinus tenderness. Left Sinus: Maxillary sinus tenderness present. No frontal sinus tenderness. Mouth/Throat:      Pharynx: Oropharynx is clear. Uvula midline. Tonsils: 1+ on the right. 1+ on the left. Cardiovascular:      Rate and Rhythm: Normal rate and regular rhythm. Heart sounds: Normal heart sounds, S1 normal and S2 normal.   Pulmonary:      Effort: Pulmonary effort is normal.      Breath sounds: Normal breath sounds. Lymphadenopathy:      Cervical: No cervical adenopathy. Skin:     General: Skin is warm and dry. Capillary Refill: Capillary refill takes less than 2 seconds. Neurological:      Mental Status: She is alert and oriented to person, place, and time.

## 2023-08-29 NOTE — PATIENT INSTRUCTIONS
Take medication as directed. Rest and drink plenty of fluids. A cool mist humidifier and saline rinse can be helpful. If you develop a worsening cough, chest pain, shortness of breath, palpitations, coughing up blood, prolonged high fever, severe headache, dizziness, any new or concerning symptoms please return or proceed ER.   Recommend following up with PCP in 3-5 days

## 2023-09-18 DIAGNOSIS — M25.512 ACUTE PAIN OF LEFT SHOULDER: Primary | ICD-10-CM

## 2023-09-30 ENCOUNTER — OFFICE VISIT (OUTPATIENT)
Dept: OBGYN CLINIC | Facility: CLINIC | Age: 53
End: 2023-09-30
Payer: COMMERCIAL

## 2023-09-30 VITALS
DIASTOLIC BLOOD PRESSURE: 91 MMHG | HEIGHT: 61 IN | WEIGHT: 205.4 LBS | HEART RATE: 74 BPM | SYSTOLIC BLOOD PRESSURE: 148 MMHG | BODY MASS INDEX: 38.78 KG/M2 | TEMPERATURE: 98.4 F

## 2023-09-30 DIAGNOSIS — M25.512 ACUTE PAIN OF LEFT SHOULDER: ICD-10-CM

## 2023-09-30 DIAGNOSIS — M75.32 CALCIFIC TENDONITIS OF LEFT SHOULDER: Primary | ICD-10-CM

## 2023-09-30 PROCEDURE — 99244 OFF/OP CNSLTJ NEW/EST MOD 40: CPT | Performed by: FAMILY MEDICINE

## 2023-09-30 PROCEDURE — 20610 DRAIN/INJ JOINT/BURSA W/O US: CPT | Performed by: FAMILY MEDICINE

## 2023-09-30 RX ORDER — ROPIVACAINE HYDROCHLORIDE 5 MG/ML
10 INJECTION, SOLUTION EPIDURAL; INFILTRATION; PERINEURAL
Status: COMPLETED | OUTPATIENT
Start: 2023-09-30 | End: 2023-09-30

## 2023-09-30 RX ORDER — TRIAMCINOLONE ACETONIDE 40 MG/ML
40 INJECTION, SUSPENSION INTRA-ARTICULAR; INTRAMUSCULAR
Status: COMPLETED | OUTPATIENT
Start: 2023-09-30 | End: 2023-09-30

## 2023-09-30 RX ADMIN — ROPIVACAINE HYDROCHLORIDE 10 ML: 5 INJECTION, SOLUTION EPIDURAL; INFILTRATION; PERINEURAL at 08:00

## 2023-09-30 RX ADMIN — TRIAMCINOLONE ACETONIDE 40 MG: 40 INJECTION, SUSPENSION INTRA-ARTICULAR; INTRAMUSCULAR at 08:00

## 2023-09-30 NOTE — PATIENT INSTRUCTIONS
F/u as needed  L shoulder SA steroid injection  Continue home exercises. Icing/heat/OTC pain meds as needed.

## 2023-09-30 NOTE — PROGRESS NOTES
[facility] [address] [id number] [phone]      Assessment:  1. Calcific tendonitis of left shoulder    2. Acute pain of left shoulder  -     Ambulatory Referral to Orthopedic Surgery        Plan:  Patient Instructions   F/u as needed  L shoulder SA steroid injection  Continue home exercises. Icing/heat/OTC pain meds as needed. Return if symptoms worsen or fail to improve. Chief Complaint:  Chief Complaint   Patient presents with   • Left Shoulder - Pain       Subjective:   HPI    Patient ID: Vincenzo Marin is a 46 y.o. female     Here c/o L shoulder pain  Pain for months  Went to PT- mainly worked on neck  Doing home exercises- worsened pain  Pain for about 4 months  Denies injury  Pain reaching back/up/pulling up/laying on/reaching back  advil PRN    LEFT SHOULDER     INDICATION:   M25.512: Pain in left shoulder.     COMPARISON:  None     VIEWS:  XR SHOULDER 2+ VW LEFT        FINDINGS:     There is no acute fracture or dislocation.     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     No lytic or blastic osseous lesion.     Several calcifications along the superior and lateral humeral head which may be indicative of calcific tendinitis.     IMPRESSION:     No acute osseous abnormality.     Calcific tendinitis. Review of Systems   Constitutional: Negative for fatigue and fever. Respiratory: Negative for shortness of breath. Cardiovascular: Negative for chest pain. Gastrointestinal: Negative for abdominal pain and nausea. Genitourinary: Negative for dysuria. Musculoskeletal: Positive for arthralgias. Skin: Negative for rash and wound. Neurological: Negative for weakness and headaches.        Objective:  Vitals:  /91 (BP Location: Left arm, Patient Position: Sitting, Cuff Size: Standard)   Pulse 74   Temp 98.4 °F (36.9 °C) (Tympanic)   Ht 5' 1" (1.549 m)   Wt 93.2 kg (205 lb 6.4 oz)   LMP  (LMP Unknown)   BMI 38.81 kg/m²     The following portions of the patient's history were reviewed and updated as appropriate: allergies, current medications, past family history, past medical history, past social history, past surgical history, and problem list.    Physical exam:  Physical Exam  Ortho Exam      I have personally reviewed pertinent films in PACS and my interpretation is XR-  L shoulder  several calcifications superior lateral humeral head. no fx. Large joint arthrocentesis: L subacromial bursa  Universal Protocol:  Consent: Verbal consent obtained. Risks and benefits: risks, benefits and alternatives were discussed  Consent given by: patient  Time out: Immediately prior to procedure a "time out" was called to verify the correct patient, procedure, equipment, support staff and site/side marked as required.   Timeout called at: 9/30/2023 8:19 AM.  Site marked: the operative site was marked  Supporting Documentation  Indications: pain   Procedure Details  Location: shoulder - L subacromial bursa  Preparation: Patient was prepped and draped in the usual sterile fashion  Needle size: 25 G  Ultrasound guidance: no  Approach: posterolateral  Medications administered: 10 mL ropivacaine 0.5 %; 40 mg triamcinolone acetonide 40 mg/mL    Patient tolerance: patient tolerated the procedure well with no immediate complications  Dressing:  Sterile dressing applied        Glorine MD Jorge

## 2023-09-30 NOTE — LETTER
September 30, 2023     [address]    Patient: Lima Lang   YOB: 1970   Date of Visit: 9/30/2023       Dear Dr. Noris Willis: Thank you for referring Michele Wynn to me for evaluation. Below are my notes for this consultation. If you have questions, please do not hesitate to call me. I look forward to following your patient along with you. Sincerely,        Daphney Ariza MD        CC: No Recipients    Daphney Ariza MD  9/30/2023  8:21 AM  Incomplete  [facility] [place] [id number] [facility] [address] [place] [address] [phone]      Assessment:  1. Calcific tendonitis of left shoulder    2. Acute pain of left shoulder  -     Ambulatory Referral to Orthopedic Surgery        Plan:  Patient Instructions   F/u as needed  L shoulder SA steroid injection  Continue home exercises. Icing/heat/OTC pain meds as needed. Return if symptoms worsen or fail to improve. Chief Complaint:  Chief Complaint   Patient presents with   • Left Shoulder - Pain       Subjective:   HPI    Patient ID: Lima Lang is a 46 y.o. female     Here c/o L shoulder pain  Pain for months  Went to PT- mainly worked on neck  Doing home exercises- worsened pain  Pain for about 4 months  Denies injury  Pain reaching back/up/pulling up/laying on/reaching back  advil PRN    LEFT SHOULDER     INDICATION:   M25.512: Pain in left shoulder. COMPARISON:  None     VIEWS:  XR SHOULDER 2+ VW LEFT        FINDINGS:     There is no acute fracture or dislocation. 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 No lytic or blastic osseous lesion. Several calcifications along the superior and lateral humeral head which may be indicative of calcific tendinitis. IMPRESSION:     No acute osseous abnormality. Calcific tendinitis.       Review of Systems   Constitutional: Negative for fatigue and fever. Respiratory: Negative for shortness of breath. Cardiovascular: Negative for chest pain. Gastrointestinal: Negative for abdominal pain and nausea. Genitourinary: Negative for dysuria. Musculoskeletal: Positive for arthralgias. Skin: Negative for rash and wound. Neurological: Negative for weakness and headaches. Objective:  Vitals:  /91 (BP Location: Left arm, Patient Position: Sitting, Cuff Size: Standard)   Pulse 74   Temp 98.4 °F (36.9 °C) (Tympanic)   Ht 5' 1" (1.549 m)   Wt 93.2 kg (205 lb 6.4 oz)   LMP  (LMP Unknown)   BMI 38.81 kg/m²     The following portions of the patient's history were reviewed and updated as appropriate: allergies, current medications, past family history, past medical history, past social history, past surgical history, and problem list.    Physical exam:  Physical Exam  Ortho Exam      I have personally reviewed pertinent films in PACS and my interpretation is XR-  L shoulder  several calcifications superior lateral humeral head. no fx. Large joint arthrocentesis: L subacromial bursa  Universal Protocol:  Consent: Verbal consent obtained. Risks and benefits: risks, benefits and alternatives were discussed  Consent given by: patient  Time out: Immediately prior to procedure a "time out" was called to verify the correct patient, procedure, equipment, support staff and site/side marked as required.   Timeout called at: 9/30/2023 8:19 AM.  Site marked: the operative site was marked  Supporting Documentation  Indications: pain   Procedure Details  Location: shoulder - L subacromial bursa  Preparation: Patient was prepped and draped in the usual sterile fashion  Needle size: 25 G  Ultrasound guidance: no  Approach: posterolateral  Medications administered: 10 mL ropivacaine 0.5 %; 40 mg triamcinolone acetonide 40 mg/mL    Patient tolerance: patient tolerated the procedure well with no immediate complications  Dressing:  Sterile dressing applied        Karishma Jeronimo MD

## 2023-11-09 DIAGNOSIS — E11.9 TYPE 2 DIABETES MELLITUS WITHOUT COMPLICATION, WITHOUT LONG-TERM CURRENT USE OF INSULIN (HCC): ICD-10-CM

## 2023-11-09 DIAGNOSIS — E78.49 OTHER HYPERLIPIDEMIA: ICD-10-CM

## 2023-11-09 DIAGNOSIS — E03.9 HYPOTHYROIDISM, UNSPECIFIED TYPE: Primary | ICD-10-CM

## 2023-11-11 ENCOUNTER — APPOINTMENT (OUTPATIENT)
Dept: LAB | Facility: CLINIC | Age: 53
End: 2023-11-11
Payer: COMMERCIAL

## 2023-11-11 DIAGNOSIS — E78.49 OTHER HYPERLIPIDEMIA: ICD-10-CM

## 2023-11-11 DIAGNOSIS — E03.9 HYPOTHYROIDISM, UNSPECIFIED TYPE: ICD-10-CM

## 2023-11-11 DIAGNOSIS — E11.9 TYPE 2 DIABETES MELLITUS WITHOUT COMPLICATION, WITHOUT LONG-TERM CURRENT USE OF INSULIN (HCC): ICD-10-CM

## 2023-11-11 LAB
ALBUMIN SERPL BCP-MCNC: 4.2 G/DL (ref 3.5–5)
ALP SERPL-CCNC: 94 U/L (ref 34–104)
ALT SERPL W P-5'-P-CCNC: 29 U/L (ref 7–52)
ANION GAP SERPL CALCULATED.3IONS-SCNC: 8 MMOL/L
AST SERPL W P-5'-P-CCNC: 28 U/L (ref 13–39)
BILIRUB SERPL-MCNC: 0.77 MG/DL (ref 0.2–1)
BUN SERPL-MCNC: 15 MG/DL (ref 5–25)
CALCIUM SERPL-MCNC: 9.7 MG/DL (ref 8.4–10.2)
CHLORIDE SERPL-SCNC: 103 MMOL/L (ref 96–108)
CHOLEST SERPL-MCNC: 196 MG/DL
CO2 SERPL-SCNC: 30 MMOL/L (ref 21–32)
CREAT SERPL-MCNC: 0.94 MG/DL (ref 0.6–1.3)
EST. AVERAGE GLUCOSE BLD GHB EST-MCNC: 169 MG/DL
GFR SERPL CREATININE-BSD FRML MDRD: 68 ML/MIN/1.73SQ M
GLUCOSE P FAST SERPL-MCNC: 111 MG/DL (ref 65–99)
HBA1C MFR BLD: 7.5 %
HDLC SERPL-MCNC: 68 MG/DL
LDLC SERPL CALC-MCNC: 105 MG/DL (ref 0–100)
NONHDLC SERPL-MCNC: 128 MG/DL
POTASSIUM SERPL-SCNC: 4.9 MMOL/L (ref 3.5–5.3)
PROT SERPL-MCNC: 7.1 G/DL (ref 6.4–8.4)
SODIUM SERPL-SCNC: 141 MMOL/L (ref 135–147)
T4 FREE SERPL-MCNC: 0.71 NG/DL (ref 0.61–1.12)
TRIGL SERPL-MCNC: 117 MG/DL
TSH SERPL DL<=0.05 MIU/L-ACNC: 4.57 UIU/ML (ref 0.45–4.5)

## 2023-11-11 PROCEDURE — 36415 COLL VENOUS BLD VENIPUNCTURE: CPT

## 2023-11-11 PROCEDURE — 84439 ASSAY OF FREE THYROXINE: CPT

## 2023-11-11 PROCEDURE — 80053 COMPREHEN METABOLIC PANEL: CPT

## 2023-11-11 PROCEDURE — 84443 ASSAY THYROID STIM HORMONE: CPT

## 2023-11-11 PROCEDURE — 80061 LIPID PANEL: CPT

## 2023-11-11 PROCEDURE — 83036 HEMOGLOBIN GLYCOSYLATED A1C: CPT

## 2023-11-12 DIAGNOSIS — E03.9 HYPOTHYROIDISM, UNSPECIFIED TYPE: ICD-10-CM

## 2023-11-12 RX ORDER — LEVOTHYROXINE SODIUM 0.07 MG/1
75 TABLET ORAL DAILY
Qty: 90 TABLET | Refills: 0 | Status: SHIPPED | OUTPATIENT
Start: 2023-11-12

## 2023-12-05 ENCOUNTER — OFFICE VISIT (OUTPATIENT)
Dept: FAMILY MEDICINE CLINIC | Facility: CLINIC | Age: 53
End: 2023-12-05
Payer: COMMERCIAL

## 2023-12-05 VITALS
WEIGHT: 209.4 LBS | DIASTOLIC BLOOD PRESSURE: 82 MMHG | OXYGEN SATURATION: 98 % | HEIGHT: 61 IN | RESPIRATION RATE: 16 BRPM | BODY MASS INDEX: 39.53 KG/M2 | SYSTOLIC BLOOD PRESSURE: 128 MMHG | HEART RATE: 72 BPM | TEMPERATURE: 97.3 F

## 2023-12-05 DIAGNOSIS — J31.0 CHRONIC RHINITIS: ICD-10-CM

## 2023-12-05 DIAGNOSIS — K21.9 GERD WITHOUT ESOPHAGITIS: ICD-10-CM

## 2023-12-05 DIAGNOSIS — E66.9 OBESITY (BMI 35.0-39.9 WITHOUT COMORBIDITY): ICD-10-CM

## 2023-12-05 DIAGNOSIS — F41.9 ANXIETY: ICD-10-CM

## 2023-12-05 DIAGNOSIS — E11.9 TYPE 2 DIABETES MELLITUS WITHOUT COMPLICATION, WITHOUT LONG-TERM CURRENT USE OF INSULIN (HCC): Primary | ICD-10-CM

## 2023-12-05 DIAGNOSIS — E55.9 VITAMIN D DEFICIENCY: ICD-10-CM

## 2023-12-05 DIAGNOSIS — E78.49 OTHER HYPERLIPIDEMIA: ICD-10-CM

## 2023-12-05 DIAGNOSIS — E03.9 HYPOTHYROIDISM, UNSPECIFIED TYPE: ICD-10-CM

## 2023-12-05 PROCEDURE — 99214 OFFICE O/P EST MOD 30 MIN: CPT | Performed by: FAMILY MEDICINE

## 2023-12-05 RX ORDER — LEVOTHYROXINE SODIUM 0.07 MG/1
75 TABLET ORAL DAILY
Qty: 90 TABLET | Refills: 0 | Status: SHIPPED | OUTPATIENT
Start: 2023-12-05

## 2023-12-05 RX ORDER — ATORVASTATIN CALCIUM 10 MG/1
10 TABLET, FILM COATED ORAL DAILY
Qty: 90 TABLET | Refills: 0 | Status: SHIPPED | OUTPATIENT
Start: 2023-12-05 | End: 2023-12-05 | Stop reason: SDUPTHER

## 2023-12-05 RX ORDER — ATORVASTATIN CALCIUM 10 MG/1
10 TABLET, FILM COATED ORAL DAILY
Qty: 90 TABLET | Refills: 0 | Status: SHIPPED | OUTPATIENT
Start: 2023-12-05

## 2023-12-05 RX ORDER — FLUTICASONE PROPIONATE 50 MCG
2 SPRAY, SUSPENSION (ML) NASAL DAILY
Qty: 16 G | Refills: 3 | Status: SHIPPED | OUTPATIENT
Start: 2023-12-05

## 2023-12-05 RX ORDER — ALPRAZOLAM 0.25 MG/1
0.25 TABLET ORAL 2 TIMES DAILY PRN
Qty: 30 TABLET | Refills: 0 | Status: SHIPPED | OUTPATIENT
Start: 2023-12-05

## 2023-12-05 NOTE — PROGRESS NOTES
Assessment/Plan:  Patient to continue present treatment. Instructed to follow a low-fat, low-salt and a low sugar/carbohydrate diet more carefully and get regular aerobic exercise walking 150 minutes/week. Weight loss strongly encouraged and discussed losing 10% of body weight for approximately 20 pounds over the next 3 to 6 months. Discussed referral to weight management and our dietitian although patient declines at this time. Patient to schedule diabetic eye exam.  Return to the office in 3 months. Type 2 diabetes mellitus without complication, without long-term current use of insulin ([facility])  Overall diabetic control worse with recent fingerstick hemoglobin A1c at 7.5. Discussed treatment options and patient prefers to hold off on medication at this time and following low sugar/low carbohydrate diet more carefully and get regular exercise and weight loss. Discussed goal of hemoglobin A1c less than 7. Lab Results   Component Value Date    HGBA1C 7.5 (H) 11/11/2023        Diagnoses and all orders for this visit:    Type 2 diabetes mellitus without complication, without long-term current use of insulin ([facility])    Hypothyroidism, unspecified type  -     levothyroxine 75 mcg tablet; Take 1 tablet (75 mcg total) by mouth daily    Other hyperlipidemia  -     Discontinue: atorvastatin (LIPITOR) 10 mg tablet; Take 1 tablet (10 mg total) by mouth daily  -     atorvastatin (LIPITOR) 10 mg tablet; Take 1 tablet (10 mg total) by mouth daily    GERD without esophagitis    Anxiety  Comments:  Alprazolam 0.25 mg b.i.d. p.r.n..  Orders:  -     ALPRAZolam (XANAX) 0.25 mg tablet; Take 1 tablet (0.25 mg total) by mouth 2 (two) times a day as needed for anxiety    Chronic rhinitis  -     fluticasone (FLONASE) 50 mcg/act nasal spray; 2 sprays into each nostril daily    Obesity (BMI 35.0-39.9 without comorbidity)    Vitamin D deficiency          Subjective:      Patient ID: Vincenzo Marin is a 46 y.o. female.     Patient is here with her sister for follow-up appoint for chronic conditions and to discuss recent fasting labs. Patient is been feeling well overall. She admits to not following her diet carefully and no regular exercise recently and weight is up approximately 10 pounds. Patient is up-to-date on diabetic eye exam and is due for her annual exam soon. Patient did receive yearly flu vaccine at work. Patient requests note to be excused from jury duty secondary to significant anxiety last time she was called to serve. Patient admits to decreased hearing and ears feeling blocked. Diabetes  She presents for her follow-up diabetic visit. She has type 2 diabetes mellitus. Her disease course has been worsening. There are no hypoglycemic associated symptoms. Associated symptoms include fatigue. Pertinent negatives for diabetes include no blurred vision, no chest pain, no foot paresthesias, no foot ulcerations, no polydipsia, no polyuria, no visual change, no weakness and no weight loss. There are no hypoglycemic complications. Symptoms are stable. Pertinent negatives for diabetic complications include no CVA, heart disease, nephropathy, peripheral neuropathy or retinopathy. Risk factors for coronary artery disease include family history, dyslipidemia, diabetes mellitus, obesity and post-menopausal. Current diabetic treatment includes diet. She is compliant with treatment most of the time. Her weight is increasing rapidly. She is following a generally healthy diet. She participates in exercise intermittently. An ACE inhibitor/angiotensin II receptor blocker is not being taken. She does not see a podiatrist.Eye exam is current. The following portions of the patient's history were reviewed and updated as appropriate: allergies, current medications, past family history, past medical history, past social history, past surgical history, and problem list.    Review of Systems   Constitutional:  Positive for fatigue.  Negative for weight loss. Eyes:  Negative for blurred vision. Cardiovascular:  Negative for chest pain. Endocrine: Negative for polydipsia and polyuria. Neurological:  Negative for weakness. Objective:      /82   Pulse 72   Temp (!) 97.3 °F (36.3 °C)   Resp 16   Ht 5' 1" (1.549 m)   Wt 95 kg (209 lb 6.4 oz)   LMP  (LMP Unknown)   SpO2 98%   BMI 39.57 kg/m²          Physical Exam  Constitutional:       General: She is not in acute distress. Appearance: Normal appearance. She is obese. HENT:      Head: Normocephalic. Right Ear: Tympanic membrane normal.      Left Ear: Tympanic membrane normal.      Nose: Congestion present. Mouth/Throat:      Mouth: Mucous membranes are moist.      Pharynx: Oropharynx is clear. Eyes:      General: No scleral icterus. Conjunctiva/sclera: Conjunctivae normal.   Neck:      Vascular: No carotid bruit. Cardiovascular:      Rate and Rhythm: Normal rate and regular rhythm. Pulmonary:      Effort: Pulmonary effort is normal.      Breath sounds: Normal breath sounds. Abdominal:      Palpations: Abdomen is soft. Tenderness: There is no abdominal tenderness. Musculoskeletal:      Cervical back: Neck supple. Right lower leg: No edema. Left lower leg: No edema. Lymphadenopathy:      Cervical: No cervical adenopathy. Skin:     General: Skin is warm and dry. Neurological:      General: No focal deficit present. Mental Status: She is alert and oriented to person, place, and time. Psychiatric:         Mood and Affect: Mood normal.         Behavior: Behavior normal.         Thought Content:  Thought content normal.         Judgment: Judgment normal.

## 2023-12-05 NOTE — ASSESSMENT & PLAN NOTE
Overall diabetic control worse with recent fingerstick hemoglobin A1c at 7.5. Discussed treatment options and patient prefers to hold off on medication at this time and following low sugar/low carbohydrate diet more carefully and get regular exercise and weight loss. Discussed goal of hemoglobin A1c less than 7.   Lab Results   Component Value Date    HGBA1C 7.5 (H) 11/11/2023

## 2023-12-19 DIAGNOSIS — L30.9 ECZEMA, UNSPECIFIED TYPE: ICD-10-CM

## 2023-12-19 RX ORDER — TRIAMCINOLONE ACETONIDE 1 MG/G
OINTMENT TOPICAL 2 TIMES DAILY
Qty: 30 G | Refills: 0 | Status: SHIPPED | OUTPATIENT
Start: 2023-12-19

## 2023-12-23 ENCOUNTER — APPOINTMENT (OUTPATIENT)
Dept: LAB | Age: 53
End: 2023-12-23
Payer: COMMERCIAL

## 2023-12-23 DIAGNOSIS — E03.9 HYPOTHYROIDISM, UNSPECIFIED TYPE: ICD-10-CM

## 2023-12-23 LAB — TSH SERPL DL<=0.05 MIU/L-ACNC: 2.21 UIU/ML (ref 0.45–4.5)

## 2023-12-23 PROCEDURE — 36415 COLL VENOUS BLD VENIPUNCTURE: CPT

## 2023-12-23 PROCEDURE — 84443 ASSAY THYROID STIM HORMONE: CPT

## 2023-12-26 DIAGNOSIS — E03.9 HYPOTHYROIDISM, UNSPECIFIED TYPE: ICD-10-CM

## 2023-12-26 RX ORDER — LEVOTHYROXINE SODIUM 0.07 MG/1
75 TABLET ORAL DAILY
Qty: 90 TABLET | Refills: 0 | Status: SHIPPED | OUTPATIENT
Start: 2023-12-26

## 2024-02-09 DIAGNOSIS — E78.49 OTHER HYPERLIPIDEMIA: ICD-10-CM

## 2024-02-09 DIAGNOSIS — J31.0 CHRONIC RHINITIS: ICD-10-CM

## 2024-02-09 RX ORDER — ATORVASTATIN CALCIUM 10 MG/1
10 TABLET, FILM COATED ORAL DAILY
Qty: 90 TABLET | Refills: 1 | Status: SHIPPED | OUTPATIENT
Start: 2024-02-09

## 2024-02-09 RX ORDER — FLUTICASONE PROPIONATE 50 MCG
2 SPRAY, SUSPENSION (ML) NASAL DAILY
Qty: 48 G | Refills: 1 | Status: SHIPPED | OUTPATIENT
Start: 2024-02-09

## 2024-03-05 ENCOUNTER — OFFICE VISIT (OUTPATIENT)
Dept: FAMILY MEDICINE CLINIC | Facility: CLINIC | Age: 54
End: 2024-03-05
Payer: COMMERCIAL

## 2024-03-05 VITALS
HEIGHT: 61 IN | HEART RATE: 64 BPM | RESPIRATION RATE: 16 BRPM | SYSTOLIC BLOOD PRESSURE: 142 MMHG | DIASTOLIC BLOOD PRESSURE: 82 MMHG | WEIGHT: 208 LBS | BODY MASS INDEX: 39.27 KG/M2 | OXYGEN SATURATION: 98 % | TEMPERATURE: 97.4 F

## 2024-03-05 DIAGNOSIS — E55.9 VITAMIN D DEFICIENCY: ICD-10-CM

## 2024-03-05 DIAGNOSIS — F41.9 ANXIETY: ICD-10-CM

## 2024-03-05 DIAGNOSIS — K21.9 GERD WITHOUT ESOPHAGITIS: ICD-10-CM

## 2024-03-05 DIAGNOSIS — E11.9 TYPE 2 DIABETES MELLITUS WITHOUT COMPLICATION, WITHOUT LONG-TERM CURRENT USE OF INSULIN (HCC): ICD-10-CM

## 2024-03-05 DIAGNOSIS — Z11.4 SCREENING FOR HIV (HUMAN IMMUNODEFICIENCY VIRUS): ICD-10-CM

## 2024-03-05 DIAGNOSIS — E03.9 HYPOTHYROIDISM, UNSPECIFIED TYPE: Primary | ICD-10-CM

## 2024-03-05 DIAGNOSIS — E66.9 OBESITY (BMI 35.0-39.9 WITHOUT COMORBIDITY): ICD-10-CM

## 2024-03-05 LAB — SL AMB POCT HEMOGLOBIN AIC: 7 (ref ?–6.5)

## 2024-03-05 PROCEDURE — 83036 HEMOGLOBIN GLYCOSYLATED A1C: CPT | Performed by: FAMILY MEDICINE

## 2024-03-05 PROCEDURE — 99214 OFFICE O/P EST MOD 30 MIN: CPT | Performed by: FAMILY MEDICINE

## 2024-03-05 NOTE — ASSESSMENT & PLAN NOTE
Overall diabetic control improved and at goal with fingerstick hemoglobin A1c at 7.0 today.  Continue present treatment.  Lab Results   Component Value Date    HGBA1C 7.0 (A) 03/05/2024

## 2024-03-05 NOTE — PROGRESS NOTES
Diabetic Foot Exam    Patient's shoes and socks removed.    Right Foot/Ankle   Right Foot Inspection  Skin Exam: skin normal, skin intact, callus and callus. No dry skin, no warmth, no erythema, no maceration, no abnormal color, no pre-ulcer and no ulcer.     Toe Exam: ROM and strength within normal limits.     Sensory   Vibration: intact  Proprioception: intact  Monofilament testing: intact    Vascular  Capillary refills: < 3 seconds  The right DP pulse is 2+. The right PT pulse is 2+.     Left Foot/Ankle  Left Foot Inspection  Skin Exam: skin normal, skin intact and callus. No dry skin, no warmth, no erythema, no maceration, normal color, no pre-ulcer and no ulcer.     Toe Exam: ROM and strength within normal limits.     Sensory   Vibration: intact  Proprioception: intact  Monofilament testing: intact    Vascular  Capillary refills: < 3 seconds  The left DP pulse is 2+. The left PT pulse is 2+.     Assign Risk Category  No deformity present  No loss of protective sensation  No weak pulses  Risk: 0

## 2024-03-05 NOTE — PROGRESS NOTES
Assessment/Plan:  Patient to continue present treatment.  Instructed to follow a low-fat, low salt and a low sugar/carbohydrate diet more carefully and get regular aerobic exercise walking 150 minutes/week.  Weight loss strongly encouraged.  Patient to schedule diabetic eye exam.  Return to the office in 6 months.  Type 2 diabetes mellitus without complication, without long-term current use of insulin (HCC)  Overall diabetic control improved and at goal with fingerstick hemoglobin A1c at 7.0 today.  Continue present treatment.  Lab Results   Component Value Date    HGBA1C 7.0 (A) 03/05/2024        Diagnoses and all orders for this visit:    Hypothyroidism, unspecified type    Type 2 diabetes mellitus without complication, without long-term current use of insulin (HCC)  -     POCT hemoglobin A1c    Screening for HIV (human immunodeficiency virus)    GERD without esophagitis    Obesity (BMI 35.0-39.9 without comorbidity)    Anxiety    Vitamin D deficiency          Subjective:      Patient ID: Marta Dalton is a 53 y.o. female.    Patient is here with her sister for follow-up appoint for chronic conditions.  Patient is been feeling well overall and is been trying to follow a diabetic diet more carefully as she has cut out soda.  Patient has been walking about 3 times a week 20 minutes.    Diabetes  She presents for her follow-up diabetic visit. She has type 2 diabetes mellitus. Her disease course has been improving. There are no hypoglycemic associated symptoms. Pertinent negatives for diabetes include no blurred vision, no chest pain, no fatigue, no foot paresthesias, no foot ulcerations, no polydipsia, no polyuria, no visual change, no weakness and no weight loss. There are no hypoglycemic complications. Symptoms are stable. Pertinent negatives for diabetic complications include no CVA, heart disease, nephropathy or peripheral neuropathy. Risk factors for coronary artery disease include family history,  "dyslipidemia, diabetes mellitus, obesity and post-menopausal. Current diabetic treatment includes diet. She is compliant with treatment most of the time. She is following a generally healthy diet. She participates in exercise three times a week. An ACE inhibitor/angiotensin II receptor blocker is not being taken. She does not see a podiatrist.Eye exam is not current.       The following portions of the patient's history were reviewed and updated as appropriate: allergies, current medications, past family history, past medical history, past social history, past surgical history, and problem list.    Review of Systems   Constitutional:  Negative for fatigue and weight loss.   Eyes:  Negative for blurred vision.   Cardiovascular:  Negative for chest pain.   Endocrine: Negative for polydipsia and polyuria.   Neurological:  Negative for weakness.         Objective:      /82   Pulse 64   Temp (!) 97.4 °F (36.3 °C)   Resp 16   Ht 5' 1\" (1.549 m)   Wt 94.3 kg (208 lb)   LMP  (LMP Unknown)   SpO2 98%   BMI 39.30 kg/m²          Physical Exam  Constitutional:       General: She is not in acute distress.     Appearance: Normal appearance. She is obese.   HENT:      Head: Normocephalic.      Mouth/Throat:      Mouth: Mucous membranes are moist.   Eyes:      General: No scleral icterus.     Conjunctiva/sclera: Conjunctivae normal.   Neck:      Vascular: No carotid bruit.   Cardiovascular:      Rate and Rhythm: Normal rate and regular rhythm.   Pulmonary:      Effort: Pulmonary effort is normal.      Breath sounds: Normal breath sounds.   Abdominal:      Palpations: Abdomen is soft.      Tenderness: There is no abdominal tenderness.   Musculoskeletal:      Cervical back: Neck supple.      Right lower leg: No edema.      Left lower leg: No edema.   Lymphadenopathy:      Cervical: No cervical adenopathy.   Skin:     General: Skin is warm and dry.   Neurological:      General: No focal deficit present.      Mental " Status: She is alert and oriented to person, place, and time.   Psychiatric:         Mood and Affect: Mood normal.         Behavior: Behavior normal.         Thought Content: Thought content normal.         Judgment: Judgment normal.

## 2024-03-15 DIAGNOSIS — E03.9 HYPOTHYROIDISM, UNSPECIFIED TYPE: ICD-10-CM

## 2024-03-15 RX ORDER — LEVOTHYROXINE SODIUM 0.07 MG/1
75 TABLET ORAL DAILY
Qty: 90 TABLET | Refills: 1 | Status: SHIPPED | OUTPATIENT
Start: 2024-03-15

## 2024-06-18 ENCOUNTER — HOSPITAL ENCOUNTER (OUTPATIENT)
Dept: RADIOLOGY | Age: 54
Discharge: HOME | End: 2024-06-18
Payer: COMMERCIAL

## 2024-06-18 DIAGNOSIS — Z12.31 ENCOUNTER FOR SCREENING MAMMOGRAM FOR MALIGNANT NEOPLASM OF BREAST: ICD-10-CM

## 2024-06-18 PROCEDURE — 77063 BREAST TOMOSYNTHESIS BI: CPT

## 2024-06-18 PROCEDURE — 77067 SCR MAMMO BI INCL CAD: CPT

## 2024-06-30 DIAGNOSIS — F41.9 ANXIETY: ICD-10-CM

## 2024-08-03 DIAGNOSIS — Z00.6 ENCOUNTER FOR EXAMINATION FOR NORMAL COMPARISON OR CONTROL IN CLINICAL RESEARCH PROGRAM: ICD-10-CM

## 2024-08-10 ENCOUNTER — APPOINTMENT (OUTPATIENT)
Dept: LAB | Facility: HOSPITAL | Age: 54
End: 2024-08-10
Payer: COMMERCIAL

## 2024-08-10 DIAGNOSIS — Z00.6 ENCOUNTER FOR EXAMINATION FOR NORMAL COMPARISON OR CONTROL IN CLINICAL RESEARCH PROGRAM: ICD-10-CM

## 2024-08-10 PROCEDURE — 36415 COLL VENOUS BLD VENIPUNCTURE: CPT

## 2024-08-29 DIAGNOSIS — E78.49 OTHER HYPERLIPIDEMIA: ICD-10-CM

## 2024-08-29 RX ORDER — ATORVASTATIN CALCIUM 10 MG/1
10 TABLET, FILM COATED ORAL DAILY
Qty: 90 TABLET | Refills: 0 | Status: SHIPPED | OUTPATIENT
Start: 2024-08-29

## 2024-09-05 ENCOUNTER — OFFICE VISIT (OUTPATIENT)
Dept: FAMILY MEDICINE CLINIC | Facility: CLINIC | Age: 54
End: 2024-09-05
Payer: COMMERCIAL

## 2024-09-05 VITALS
OXYGEN SATURATION: 99 % | BODY MASS INDEX: 38.86 KG/M2 | HEIGHT: 61 IN | WEIGHT: 205.8 LBS | HEART RATE: 72 BPM | RESPIRATION RATE: 16 BRPM | TEMPERATURE: 97.1 F | DIASTOLIC BLOOD PRESSURE: 72 MMHG | SYSTOLIC BLOOD PRESSURE: 130 MMHG

## 2024-09-05 DIAGNOSIS — E11.9 TYPE 2 DIABETES MELLITUS WITHOUT COMPLICATION, WITHOUT LONG-TERM CURRENT USE OF INSULIN (HCC): ICD-10-CM

## 2024-09-05 DIAGNOSIS — Z00.00 ANNUAL PHYSICAL EXAM: Primary | ICD-10-CM

## 2024-09-05 DIAGNOSIS — E78.49 OTHER HYPERLIPIDEMIA: ICD-10-CM

## 2024-09-05 DIAGNOSIS — E55.9 VITAMIN D DEFICIENCY: ICD-10-CM

## 2024-09-05 DIAGNOSIS — L30.9 DERMATITIS: ICD-10-CM

## 2024-09-05 DIAGNOSIS — F41.9 ANXIETY: ICD-10-CM

## 2024-09-05 DIAGNOSIS — K21.9 GERD WITHOUT ESOPHAGITIS: ICD-10-CM

## 2024-09-05 DIAGNOSIS — E03.9 HYPOTHYROIDISM, UNSPECIFIED TYPE: ICD-10-CM

## 2024-09-05 DIAGNOSIS — E66.9 OBESITY (BMI 35.0-39.9 WITHOUT COMORBIDITY): ICD-10-CM

## 2024-09-05 LAB — SL AMB POCT HEMOGLOBIN AIC: 7.1 (ref ?–6.5)

## 2024-09-05 PROCEDURE — 99396 PREV VISIT EST AGE 40-64: CPT | Performed by: FAMILY MEDICINE

## 2024-09-05 PROCEDURE — 83036 HEMOGLOBIN GLYCOSYLATED A1C: CPT | Performed by: FAMILY MEDICINE

## 2024-09-05 RX ORDER — NYSTATIN 100000 [USP'U]/G
POWDER TOPICAL 2 TIMES DAILY
Qty: 30 G | Refills: 2 | Status: SHIPPED | OUTPATIENT
Start: 2024-09-05

## 2024-09-05 NOTE — ASSESSMENT & PLAN NOTE
Diabetes remains fairly well-controlled on diet alone with fingerstick hemoglobin A1c at 7.1 today.  Continue present treatment discussed goal of hemoglobin A1c less than 7.  Lab Results   Component Value Date    HGBA1C 7.1 (A) 09/05/2024

## 2024-09-10 ENCOUNTER — OFFICE VISIT (OUTPATIENT)
Dept: URGENT CARE | Facility: CLINIC | Age: 54
End: 2024-09-10
Payer: COMMERCIAL

## 2024-09-10 VITALS
SYSTOLIC BLOOD PRESSURE: 130 MMHG | HEIGHT: 61 IN | DIASTOLIC BLOOD PRESSURE: 70 MMHG | HEART RATE: 58 BPM | TEMPERATURE: 97.7 F | RESPIRATION RATE: 18 BRPM | OXYGEN SATURATION: 100 % | WEIGHT: 207 LBS | BODY MASS INDEX: 39.08 KG/M2

## 2024-09-10 DIAGNOSIS — J02.9 ACUTE VIRAL PHARYNGITIS: Primary | ICD-10-CM

## 2024-09-10 PROCEDURE — 99213 OFFICE O/P EST LOW 20 MIN: CPT | Performed by: STUDENT IN AN ORGANIZED HEALTH CARE EDUCATION/TRAINING PROGRAM

## 2024-09-11 NOTE — PROGRESS NOTES
[facility]        NAME: Marta Dalton is a 54 y.o. female  : 1970    MRN: [id number]  DATE: September 10, 2024  TIME: 8:15 PM    Assessment and Orders   Acute viral pharyngitis [J02.9]  1. Acute viral pharyngitis              Plan and Discussion      Patient signs and symptoms are consistent with a viral pharyngitis.  Explained that the large bumps on the back of her tongue are lingual tonsils and that it is normal for these become enlarged when they are infected with the virus.  No fevers.  Discussed supportive care.      Risks and benefits discussed. Patient understands and agrees with the plan.     PATIENT INSTRUCTIONS      If tests have been performed at [facility], our office will contact you with results if changes need to be made to the care plan discussed with you at the visit.  [facility]    Follow up with PCP.     If any of the following occur, please report to your nearest ED for evaluation or call 911.   Difficultly breathing or shortness of breath  Chest pain  Acutely worsening symptoms.         Chief Complaint     Chief Complaint   Patient presents with    Sore Throat     Started this morning with sore throat, dry, itchy throat. She thinks she has white spots on tongue..          History of Present Illness       Noticed large bumps on the back of her tongue.     Sore Throat   This is a new problem. The current episode started today. The problem has been unchanged. There has been no fever. Pertinent negatives include no congestion, shortness of breath, stridor, swollen glands or trouble swallowing.       Review of Systems   Review of Systems   HENT:  Positive for sore throat. Negative for congestion and trouble swallowing.    Respiratory:  Negative for shortness of breath and stridor.          Current Medications       Current Outpatient Medications:     ALPRAZolam (XANAX) 0.25 mg tablet, Take 1 tablet (0.25 mg total) by mouth 2 (two) times  a day as needed for anxiety, Disp: 30 tablet, Rfl: 0    atorvastatin (LIPITOR) 10 mg tablet, Take 1 tablet (10 mg total) by mouth daily, Disp: 90 tablet, Rfl: 0    Cetirizine HCl 10 MG CAPS, , Disp: , Rfl:     cholecalciferol (VITAMIN D3) 1,000 units tablet, Take 1,000 Units by mouth daily, Disp: , Rfl:     Esomeprazole Magnesium 20 MG TBEC, 2 tablets , Disp: , Rfl:     fluticasone (FLONASE) 50 mcg/act nasal spray, 2 sprays into each nostril daily, Disp: 48 g, Rfl: 1    levothyroxine 75 mcg tablet, Take 1 tablet (75 mcg total) by mouth daily, Disp: 90 tablet, Rfl: 1    MAGNESIUM PO, Take 150 mg by mouth, Disp: , Rfl:     Multiple Vitamins-Minerals (MULTIVITAMIN WOMENS 50+ ADV PO), Take 1 tablet by mouth daily, Disp: , Rfl:     nystatin (MYCOSTATIN) powder, Apply topically 2 (two) times a day, Disp: 30 g, Rfl: 2    sertraline (ZOLOFT) 50 mg tablet, Take 1 tablet (50 mg total) by mouth daily, Disp: 90 tablet, Rfl: 1    Biotin 10 MG CAPS, Take by mouth (Patient not taking: Reported on 8/29/2023), Disp: , Rfl:     triamcinolone (KENALOG) 0.1 % ointment, Apply topically 2 (two) times a day (Patient not taking: Reported on 9/10/2024), Disp: 30 g, Rfl: 0    Current Allergies     Allergies as of 09/10/2024    (No Known Allergies)            The following portions of the patient's history were reviewed and updated as appropriate: allergies, current medications, past family history, past medical history, past social history, past surgical history and problem list.     Past Medical History:   Diagnosis Date    Allergic     Since I was a child    Anxiety     Asthma     Disease of thyroid gland     Eczema     GERD (gastroesophageal reflux disease)     HL (hearing loss)     noticed a few months ago    Obesity     Since I was a child       Past Surgical History:   Procedure Laterality Date    CHOLECYSTECTOMY      DILATION AND CURETTAGE OF UTERUS      WISDOM TOOTH EXTRACTION         Family History   Problem Relation Age of Onset     "Lymphoma Mother     Liver disease Mother     Dementia Mother     Heart disease Mother     Thyroid disease Mother     Cancer Mother     Other Father     Diabetes Father     Stroke Father     Hypertension Father     Heart disease Father     Hypertension Sister     No Known Problems Maternal Grandmother     Leukemia Maternal Grandfather     No Known Problems Paternal Grandmother     No Known Problems Paternal Grandfather     No Known Problems Paternal Aunt     No Known Problems Paternal Aunt          Medications have been verified.        Objective   /70   Pulse 58   Temp 97.7 °F (36.5 °C)   Resp 18   Ht 5' 1\" (1.549 m)   Wt 93.9 kg (207 lb)   LMP  (LMP Unknown)   SpO2 100%   BMI 39.11 kg/m²   No LMP recorded (lmp unknown). Patient is postmenopausal.       Physical Exam     Physical Exam  Constitutional:       General: She is not in acute distress.     Appearance: She is not ill-appearing.   HENT:      Mouth/Throat:      Pharynx: Pharyngeal swelling and posterior oropharyngeal erythema present. No oropharyngeal exudate.      Tonsils: No tonsillar exudate or tonsillar abscesses.      Comments: Lingual tonsils are swollen   Cardiovascular:      Rate and Rhythm: Normal rate and regular rhythm.   Neurological:      General: No focal deficit present.      Mental Status: She is alert and oriented to person, place, and time.   Psychiatric:         Mood and Affect: Mood normal.         Behavior: Behavior normal.               Nydia Puckett DO       "

## 2024-09-19 DIAGNOSIS — E03.9 HYPOTHYROIDISM, UNSPECIFIED TYPE: ICD-10-CM

## 2024-09-19 RX ORDER — LEVOTHYROXINE SODIUM 75 UG/1
75 TABLET ORAL DAILY
Qty: 90 TABLET | Refills: 1 | Status: SHIPPED | OUTPATIENT
Start: 2024-09-19

## 2024-10-01 DIAGNOSIS — J03.90 TONSILLITIS: Primary | ICD-10-CM

## 2024-10-03 LAB
APOB+LDLR+PCSK9 GENE MUT ANL BLD/T: NOT DETECTED
BRCA1+BRCA2 DEL+DUP + FULL MUT ANL BLD/T: NOT DETECTED
MLH1+MSH2+MSH6+PMS2 GN DEL+DUP+FUL M: NOT DETECTED

## 2024-11-14 DIAGNOSIS — E78.49 OTHER HYPERLIPIDEMIA: ICD-10-CM

## 2024-11-15 RX ORDER — ATORVASTATIN CALCIUM 10 MG/1
10 TABLET, FILM COATED ORAL DAILY
Qty: 90 TABLET | Refills: 0 | Status: SHIPPED | OUTPATIENT
Start: 2024-11-15

## 2024-11-18 DIAGNOSIS — F41.9 ANXIETY: ICD-10-CM

## 2024-11-19 RX ORDER — ALPRAZOLAM 0.25 MG/1
0.25 TABLET ORAL 2 TIMES DAILY PRN
Qty: 30 TABLET | Refills: 0 | Status: SHIPPED | OUTPATIENT
Start: 2024-11-19

## 2025-01-08 DIAGNOSIS — F41.9 ANXIETY: ICD-10-CM

## 2025-02-07 DIAGNOSIS — J31.0 CHRONIC RHINITIS: ICD-10-CM

## 2025-02-07 RX ORDER — FLUTICASONE PROPIONATE 50 MCG
2 SPRAY, SUSPENSION (ML) NASAL DAILY
Qty: 48 G | Refills: 0 | Status: SHIPPED | OUTPATIENT
Start: 2025-02-07

## 2025-02-24 DIAGNOSIS — E78.49 OTHER HYPERLIPIDEMIA: ICD-10-CM

## 2025-02-25 RX ORDER — ATORVASTATIN CALCIUM 10 MG/1
10 TABLET, FILM COATED ORAL DAILY
Qty: 90 TABLET | Refills: 0 | Status: SHIPPED | OUTPATIENT
Start: 2025-02-25

## 2025-03-04 DIAGNOSIS — E55.9 VITAMIN D DEFICIENCY: ICD-10-CM

## 2025-03-04 DIAGNOSIS — E03.9 HYPOTHYROIDISM, UNSPECIFIED TYPE: Primary | ICD-10-CM

## 2025-03-04 DIAGNOSIS — E78.49 OTHER HYPERLIPIDEMIA: ICD-10-CM

## 2025-03-04 DIAGNOSIS — E11.9 TYPE 2 DIABETES MELLITUS WITHOUT COMPLICATION, WITHOUT LONG-TERM CURRENT USE OF INSULIN (HCC): ICD-10-CM

## 2025-03-07 ENCOUNTER — RESULTS FOLLOW-UP (OUTPATIENT)
Dept: FAMILY MEDICINE CLINIC | Facility: CLINIC | Age: 55
End: 2025-03-07

## 2025-03-07 ENCOUNTER — APPOINTMENT (OUTPATIENT)
Dept: LAB | Facility: HOSPITAL | Age: 55
End: 2025-03-07
Payer: COMMERCIAL

## 2025-03-07 DIAGNOSIS — E03.9 HYPOTHYROIDISM, UNSPECIFIED TYPE: ICD-10-CM

## 2025-03-07 DIAGNOSIS — E55.9 VITAMIN D DEFICIENCY: ICD-10-CM

## 2025-03-07 DIAGNOSIS — E11.9 TYPE 2 DIABETES MELLITUS WITHOUT COMPLICATION, WITHOUT LONG-TERM CURRENT USE OF INSULIN (HCC): ICD-10-CM

## 2025-03-07 DIAGNOSIS — E78.49 OTHER HYPERLIPIDEMIA: ICD-10-CM

## 2025-03-07 LAB
25(OH)D3 SERPL-MCNC: 42.8 NG/ML (ref 30–100)
ALBUMIN SERPL BCG-MCNC: 4.2 G/DL (ref 3.5–5)
ALP SERPL-CCNC: 88 U/L (ref 34–104)
ALT SERPL W P-5'-P-CCNC: 23 U/L (ref 7–52)
ANION GAP SERPL CALCULATED.3IONS-SCNC: 7 MMOL/L (ref 4–13)
AST SERPL W P-5'-P-CCNC: 19 U/L (ref 13–39)
BILIRUB SERPL-MCNC: 0.6 MG/DL (ref 0.2–1)
BUN SERPL-MCNC: 15 MG/DL (ref 5–25)
CALCIUM SERPL-MCNC: 9.4 MG/DL (ref 8.4–10.2)
CHLORIDE SERPL-SCNC: 104 MMOL/L (ref 96–108)
CHOLEST SERPL-MCNC: 183 MG/DL (ref ?–200)
CO2 SERPL-SCNC: 28 MMOL/L (ref 21–32)
CREAT SERPL-MCNC: 0.92 MG/DL (ref 0.6–1.3)
CREAT UR-MCNC: 96.1 MG/DL
ERYTHROCYTE [DISTWIDTH] IN BLOOD BY AUTOMATED COUNT: 13.4 % (ref 11.6–15.1)
EST. AVERAGE GLUCOSE BLD GHB EST-MCNC: 180 MG/DL
GFR SERPL CREATININE-BSD FRML MDRD: 70 ML/MIN/1.73SQ M
GLUCOSE P FAST SERPL-MCNC: 149 MG/DL (ref 65–99)
HBA1C MFR BLD: 7.9 %
HCT VFR BLD AUTO: 38.2 % (ref 34.8–46.1)
HDLC SERPL-MCNC: 59 MG/DL
HGB BLD-MCNC: 11.9 G/DL (ref 11.5–15.4)
LDLC SERPL CALC-MCNC: 96 MG/DL (ref 0–100)
MCH RBC QN AUTO: 26.7 PG (ref 26.8–34.3)
MCHC RBC AUTO-ENTMCNC: 31.2 G/DL (ref 31.4–37.4)
MCV RBC AUTO: 86 FL (ref 82–98)
MICROALBUMIN UR-MCNC: <7 MG/L
PLATELET # BLD AUTO: 328 THOUSANDS/UL (ref 149–390)
PMV BLD AUTO: 9.1 FL (ref 8.9–12.7)
POTASSIUM SERPL-SCNC: 4.9 MMOL/L (ref 3.5–5.3)
PROT SERPL-MCNC: 7.3 G/DL (ref 6.4–8.4)
RBC # BLD AUTO: 4.46 MILLION/UL (ref 3.81–5.12)
SODIUM SERPL-SCNC: 139 MMOL/L (ref 135–147)
T4 FREE SERPL-MCNC: 0.81 NG/DL (ref 0.61–1.12)
TRIGL SERPL-MCNC: 140 MG/DL (ref ?–150)
TSH SERPL DL<=0.05 MIU/L-ACNC: 5.15 UIU/ML (ref 0.45–4.5)
WBC # BLD AUTO: 7.51 THOUSAND/UL (ref 4.31–10.16)

## 2025-03-07 PROCEDURE — 83036 HEMOGLOBIN GLYCOSYLATED A1C: CPT

## 2025-03-07 PROCEDURE — 85027 COMPLETE CBC AUTOMATED: CPT

## 2025-03-07 PROCEDURE — 36415 COLL VENOUS BLD VENIPUNCTURE: CPT

## 2025-03-07 PROCEDURE — 82306 VITAMIN D 25 HYDROXY: CPT

## 2025-03-07 PROCEDURE — 82570 ASSAY OF URINE CREATININE: CPT

## 2025-03-07 PROCEDURE — 80061 LIPID PANEL: CPT

## 2025-03-07 PROCEDURE — 82043 UR ALBUMIN QUANTITATIVE: CPT

## 2025-03-07 PROCEDURE — 84439 ASSAY OF FREE THYROXINE: CPT

## 2025-03-07 PROCEDURE — 84443 ASSAY THYROID STIM HORMONE: CPT

## 2025-03-07 PROCEDURE — 80053 COMPREHEN METABOLIC PANEL: CPT

## 2025-03-18 ENCOUNTER — OFFICE VISIT (OUTPATIENT)
Dept: FAMILY MEDICINE CLINIC | Facility: CLINIC | Age: 55
End: 2025-03-18
Payer: COMMERCIAL

## 2025-03-18 VITALS
HEIGHT: 61 IN | OXYGEN SATURATION: 96 % | SYSTOLIC BLOOD PRESSURE: 138 MMHG | BODY MASS INDEX: 40.4 KG/M2 | TEMPERATURE: 99.2 F | HEART RATE: 84 BPM | WEIGHT: 214 LBS | DIASTOLIC BLOOD PRESSURE: 80 MMHG | RESPIRATION RATE: 16 BRPM

## 2025-03-18 DIAGNOSIS — E11.9 TYPE 2 DIABETES MELLITUS WITHOUT COMPLICATION, WITHOUT LONG-TERM CURRENT USE OF INSULIN (HCC): Primary | ICD-10-CM

## 2025-03-18 DIAGNOSIS — E66.9 OBESITY (BMI 35.0-39.9 WITHOUT COMORBIDITY): ICD-10-CM

## 2025-03-18 DIAGNOSIS — E78.49 OTHER HYPERLIPIDEMIA: ICD-10-CM

## 2025-03-18 DIAGNOSIS — K21.9 GERD WITHOUT ESOPHAGITIS: ICD-10-CM

## 2025-03-18 DIAGNOSIS — E55.9 VITAMIN D DEFICIENCY: ICD-10-CM

## 2025-03-18 DIAGNOSIS — E03.9 HYPOTHYROIDISM, UNSPECIFIED TYPE: ICD-10-CM

## 2025-03-18 DIAGNOSIS — Z12.4 SCREENING FOR CERVICAL CANCER: ICD-10-CM

## 2025-03-18 DIAGNOSIS — Z12.11 COLON CANCER SCREENING: ICD-10-CM

## 2025-03-18 PROCEDURE — 99214 OFFICE O/P EST MOD 30 MIN: CPT | Performed by: FAMILY MEDICINE

## 2025-03-18 RX ORDER — LEVOTHYROXINE SODIUM 88 UG/1
88 TABLET ORAL DAILY
Qty: 90 TABLET | Refills: 1 | Status: SHIPPED | OUTPATIENT
Start: 2025-03-18

## 2025-03-18 NOTE — PROGRESS NOTES
Diabetic Foot Exam    Patient's shoes and socks removed.    Right Foot/Ankle   Right Foot Inspection  Skin Exam: skin normal, skin intact and dry skin. No warmth, no callus, no erythema, no maceration, no abnormal color, no pre-ulcer, no ulcer and no callus.     Toe Exam: ROM and strength within normal limits.     Sensory   Vibration: intact  Proprioception: intact  Monofilament testing: intact    Vascular  Capillary refills: < 3 seconds  The right DP pulse is 2+. The right PT pulse is 2+.     Left Foot/Ankle  Left Foot Inspection  Skin Exam: skin normal, skin intact and dry skin. No warmth, no erythema, no maceration, normal color, no pre-ulcer, no ulcer and no callus.     Toe Exam: ROM and strength within normal limits.     Sensory   Vibration: intact  Proprioception: intact  Monofilament testing: intact    Vascular  Capillary refills: < 3 seconds  The left DP pulse is 2+. The left PT pulse is 2+.     Assign Risk Category  No deformity present  No loss of protective sensation  No weak pulses  Risk: 0

## 2025-03-18 NOTE — PROGRESS NOTES
:  Assessment & Plan  Type 2 diabetes mellitus without complication, without long-term current use of insulin (HCC)  Overall diabetic control worse with recent hemoglobin A1c at 7.9.  Discussed treatment options including starting on medication like metformin.  Patient prefers to hold off on medication at this time and try diet and exercise and weight loss.  Will recheck hemoglobin A1c in 3 months.  Patient instructed to follow a low sugar and low carbohydrate diet more carefully and get regular aerobic exercise walking up to 150 minutes/week.  Lab Results   Component Value Date    HGBA1C 7.9 (H) 03/07/2025       Orders:    Hemoglobin A1C; Future    Hypothyroidism, unspecified type  Mildly underactive thyroid function.  Will increase levothyroxine to 80 mcg daily and recheck TSH in 3 months.  Orders:    levothyroxine 88 mcg tablet; Take 1 tablet (88 mcg total) by mouth daily    TSH, 3rd generation with Free T4 reflex; Future    Other hyperlipidemia  Lipids well-controlled on atorvastatin 10 mg daily.  Continue present treatment and follow a low-fat and low-cholesterol diet.       GERD without esophagitis  Reflux symptoms well-controlled on esomeprazole 40 mg daily.  Continue present treatment.       Obesity (BMI 35.0-39.9 without comorbidity)    Discussed importance of weight loss and morbidity associated with obesity.  Recommend patient lose 10% of body weight or approximately 21 pounds over the next 6 months.       Vitamin D deficiency  Continue vitamin D supplement.       Screening for cervical cancer    Orders:    Ambulatory referral to Obstetrics / Gynecology; Future    Colon cancer screening    Orders:    Ambulatory Referral to Gastroenterology; Future        History of Present Illness     Marta Dalton is a 54 y.o. female   Patient is here with her sister for follow-up appoint for chronic conditions.  We reviewed recent labs.  Patient admits to 10 pound weight gain over the past 6 months.  She complains of  fatigue and no regular exercise program.  Patient admits to not following her diabetic diet carefully.  Patient is up-to-date with diabetic eye exam with Dr. Pagan.  Patient is due for yearly mammogram in June and 5-year follow-up colonoscopy in August of this year.    Diabetes  She presents for her follow-up diabetic visit. She has type 2 diabetes mellitus. Her disease course has been worsening. There are no hypoglycemic associated symptoms. Pertinent negatives for hypoglycemia include no nervousness/anxiousness or tremors. Associated symptoms include fatigue. Pertinent negatives for diabetes include no blurred vision, no chest pain, no foot paresthesias, no foot ulcerations, no polydipsia, no polyuria, no visual change, no weakness and no weight loss. There are no hypoglycemic complications. Symptoms are stable. Pertinent negatives for diabetic complications include no CVA, heart disease, nephropathy, peripheral neuropathy or retinopathy. Risk factors for coronary artery disease include family history, dyslipidemia, diabetes mellitus, obesity and post-menopausal. Current diabetic treatment includes diet. She is compliant with treatment some of the time. She is following a generally unhealthy diet. She participates in exercise intermittently. An ACE inhibitor/angiotensin II receptor blocker is not being taken. She does not see a podiatrist.Eye exam is current.   Thyroid Problem  Presents for follow-up visit. Symptoms include cold intolerance, constipation, dry skin, fatigue and weight gain. Patient reports no anxiety, depressed mood, diaphoresis, diarrhea, hair loss, heat intolerance, hoarse voice, leg swelling, palpitations, tremors, visual change or weight loss. The symptoms have been worsening.     Review of Systems   Constitutional:  Positive for fatigue and weight gain. Negative for diaphoresis and weight loss.   HENT:  Negative for hoarse voice.    Eyes:  Negative for blurred vision.   Cardiovascular:   "Negative for chest pain and palpitations.   Gastrointestinal:  Positive for constipation. Negative for diarrhea.   Endocrine: Positive for cold intolerance. Negative for heat intolerance, polydipsia and polyuria.   Neurological:  Negative for tremors and weakness.   Psychiatric/Behavioral:  The patient is not nervous/anxious.      Objective   /80   Pulse 84   Temp 99.2 °F (37.3 °C) (Tympanic)   Resp 16   Ht 5' 1\" (1.549 m)   Wt 97.1 kg (214 lb)   LMP  (LMP Unknown)   SpO2 96%   BMI 40.43 kg/m²      Physical Exam  Constitutional:       General: She is not in acute distress.     Appearance: Normal appearance. She is obese.   HENT:      Head: Normocephalic.      Mouth/Throat:      Mouth: Mucous membranes are moist.   Eyes:      General: No scleral icterus.     Conjunctiva/sclera: Conjunctivae normal.   Neck:      Vascular: No carotid bruit.   Cardiovascular:      Rate and Rhythm: Normal rate and regular rhythm.   Pulmonary:      Effort: Pulmonary effort is normal.      Breath sounds: Normal breath sounds.   Abdominal:      Palpations: Abdomen is soft.      Tenderness: There is no abdominal tenderness.   Musculoskeletal:      Cervical back: Neck supple.      Right lower leg: No edema.      Left lower leg: No edema.   Lymphadenopathy:      Cervical: No cervical adenopathy.   Skin:     General: Skin is warm and dry.   Neurological:      General: No focal deficit present.      Mental Status: She is alert and oriented to person, place, and time.   Psychiatric:         Mood and Affect: Mood normal.         Behavior: Behavior normal.         Thought Content: Thought content normal.         Judgment: Judgment normal.           "

## 2025-03-18 NOTE — ASSESSMENT & PLAN NOTE
Discussed importance of weight loss and morbidity associated with obesity.  Recommend patient lose 10% of body weight or approximately 21 pounds over the next 6 months.

## 2025-03-18 NOTE — ASSESSMENT & PLAN NOTE
Lipids well-controlled on atorvastatin 10 mg daily.  Continue present treatment and follow a low-fat and low-cholesterol diet.

## 2025-03-18 NOTE — ASSESSMENT & PLAN NOTE
Mildly underactive thyroid function.  Will increase levothyroxine to 80 mcg daily and recheck TSH in 3 months.  Orders:    levothyroxine 88 mcg tablet; Take 1 tablet (88 mcg total) by mouth daily    TSH, 3rd generation with Free T4 reflex; Future

## 2025-03-18 NOTE — ASSESSMENT & PLAN NOTE
Overall diabetic control worse with recent hemoglobin A1c at 7.9.  Discussed treatment options including starting on medication like metformin.  Patient prefers to hold off on medication at this time and try diet and exercise and weight loss.  Will recheck hemoglobin A1c in 3 months.  Patient instructed to follow a low sugar and low carbohydrate diet more carefully and get regular aerobic exercise walking up to 150 minutes/week.  Lab Results   Component Value Date    HGBA1C 7.9 (H) 03/07/2025       Orders:    Hemoglobin A1C; Future

## 2025-04-03 DIAGNOSIS — F41.9 ANXIETY: ICD-10-CM

## 2025-04-28 DIAGNOSIS — L30.9 ECZEMA, UNSPECIFIED TYPE: ICD-10-CM

## 2025-04-28 RX ORDER — TRIAMCINOLONE ACETONIDE 1 MG/G
OINTMENT TOPICAL 2 TIMES DAILY
Qty: 30 G | Refills: 0 | Status: SHIPPED | OUTPATIENT
Start: 2025-04-28

## 2025-05-14 DIAGNOSIS — E78.49 OTHER HYPERLIPIDEMIA: ICD-10-CM

## 2025-05-14 RX ORDER — ATORVASTATIN CALCIUM 10 MG/1
10 TABLET, FILM COATED ORAL DAILY
Qty: 90 TABLET | Refills: 1 | Status: SHIPPED | OUTPATIENT
Start: 2025-05-14

## 2025-05-31 ENCOUNTER — RESULTS FOLLOW-UP (OUTPATIENT)
Dept: FAMILY MEDICINE CLINIC | Facility: CLINIC | Age: 55
End: 2025-05-31

## 2025-05-31 ENCOUNTER — APPOINTMENT (OUTPATIENT)
Dept: LAB | Facility: HOSPITAL | Age: 55
End: 2025-05-31
Payer: COMMERCIAL

## 2025-05-31 DIAGNOSIS — E03.9 HYPOTHYROIDISM, UNSPECIFIED TYPE: ICD-10-CM

## 2025-05-31 DIAGNOSIS — E11.9 TYPE 2 DIABETES MELLITUS WITHOUT COMPLICATION, WITHOUT LONG-TERM CURRENT USE OF INSULIN (HCC): ICD-10-CM

## 2025-05-31 LAB
EST. AVERAGE GLUCOSE BLD GHB EST-MCNC: 183 MG/DL
HBA1C MFR BLD: 8 %
TSH SERPL DL<=0.05 MIU/L-ACNC: 1.97 UIU/ML (ref 0.45–4.5)

## 2025-05-31 PROCEDURE — 84443 ASSAY THYROID STIM HORMONE: CPT

## 2025-05-31 PROCEDURE — 36415 COLL VENOUS BLD VENIPUNCTURE: CPT

## 2025-05-31 PROCEDURE — 83036 HEMOGLOBIN GLYCOSYLATED A1C: CPT

## 2025-06-10 ENCOUNTER — OFFICE VISIT (OUTPATIENT)
Dept: FAMILY MEDICINE CLINIC | Facility: CLINIC | Age: 55
End: 2025-06-10
Payer: COMMERCIAL

## 2025-06-10 VITALS
BODY MASS INDEX: 40.02 KG/M2 | OXYGEN SATURATION: 98 % | HEIGHT: 61 IN | HEART RATE: 72 BPM | DIASTOLIC BLOOD PRESSURE: 82 MMHG | WEIGHT: 212 LBS | TEMPERATURE: 97.6 F | SYSTOLIC BLOOD PRESSURE: 136 MMHG | RESPIRATION RATE: 16 BRPM

## 2025-06-10 DIAGNOSIS — E03.9 HYPOTHYROIDISM, UNSPECIFIED TYPE: ICD-10-CM

## 2025-06-10 DIAGNOSIS — E11.9 TYPE 2 DIABETES MELLITUS WITHOUT COMPLICATION, WITHOUT LONG-TERM CURRENT USE OF INSULIN (HCC): Primary | ICD-10-CM

## 2025-06-10 PROCEDURE — 99214 OFFICE O/P EST MOD 30 MIN: CPT | Performed by: FAMILY MEDICINE

## 2025-06-10 NOTE — PROGRESS NOTES
:  Assessment & Plan  Type 2 diabetes mellitus without complication, without long-term current use of insulin (HCC)  Diabetes remains uncontrolled with recent hemoglobin A1c at 8.0.  Discussed treatment options and patient agrees to start metformin 500 mg 1 daily with food for 1 to 2 weeks then increased to 1 twice daily with food.  Patient is being referred to clinical pharmacist to help manage treatment.  Discussed referral to dietitian and endocrinologist.  Patient to follow a low sugar and low carbohydrate diet more carefully and get regular aerobic exercise 150 minutes/week.  Weight loss encouraged.  Discussed losing 10% of body weight or approximately 21 pounds over the next 3 to 6 months.  Return to the office in 3 months.  Lab Results   Component Value Date    HGBA1C 8.0 (H) 05/31/2025       Orders:    Ambulatory referral to clinical pharmacy; Future    metFORMIN (GLUCOPHAGE) 500 mg tablet; Take 1 tablet (500 mg total) by mouth 2 (two) times a day with meals    Hypothyroidism, unspecified type  Clinically euthyroid and recent TSH within normal.  Continue levothyroxine 88 mcg daily.           History of Present Illness     Marta Dalton is a 54 y.o. female   Patient is here with her sister for follow-up appointment for chronic conditions.  Reviewed recent labs and diabetes remains uncontrolled.  Thyroid function is normal.  Patient is feeling well overall.  She has been trying to follow a low sugar and low carbohydrate diet although admits to eating unhealthy snacks.  She has been walking 2 to 3 days a week at the park for about 30 minutes.  Patient is up-to-date on yearly eye exam.  Patient is due for colonoscopy in August and encouraged to schedule.    Diabetes  She presents for her follow-up diabetic visit. She has type 2 diabetes mellitus. Her disease course has been worsening. There are no hypoglycemic associated symptoms. Pertinent negatives for diabetes include no blurred vision, no chest pain, no  "fatigue, no foot paresthesias, no foot ulcerations, no polydipsia, no polyuria, no visual change, no weakness and no weight loss. There are no hypoglycemic complications. Symptoms are stable. Pertinent negatives for diabetic complications include no CVA, heart disease, nephropathy, peripheral neuropathy or retinopathy. Risk factors for coronary artery disease include family history, dyslipidemia, diabetes mellitus, obesity and post-menopausal. Current diabetic treatment includes diet. She is compliant with treatment some of the time. She is following a generally healthy diet. She participates in exercise three times a week. An ACE inhibitor/angiotensin II receptor blocker is not being taken. She does not see a podiatrist.Eye exam is current.     Review of Systems   Constitutional:  Negative for fatigue and weight loss.   Eyes:  Negative for blurred vision.   Cardiovascular:  Negative for chest pain.   Endocrine: Negative for polydipsia and polyuria.   Neurological:  Negative for weakness.     Objective   /82 (BP Location: Left arm, Patient Position: Sitting, Cuff Size: Standard)   Pulse 72   Temp 97.6 °F (36.4 °C) (Tympanic)   Resp 16   Ht 5' 1\" (1.549 m)   Wt 96.2 kg (212 lb)   LMP  (LMP Unknown)   SpO2 98%   BMI 40.06 kg/m²      Physical Exam  Constitutional:       General: She is not in acute distress.     Appearance: Normal appearance. She is obese.   HENT:      Head: Normocephalic.      Mouth/Throat:      Mouth: Mucous membranes are moist.     Eyes:      General: No scleral icterus.     Conjunctiva/sclera: Conjunctivae normal.     Neck:      Vascular: No carotid bruit.     Cardiovascular:      Rate and Rhythm: Normal rate and regular rhythm.   Pulmonary:      Effort: Pulmonary effort is normal.      Breath sounds: Normal breath sounds.   Abdominal:      Palpations: Abdomen is soft.      Tenderness: There is no abdominal tenderness.     Musculoskeletal:      Cervical back: Neck supple.      Right " lower leg: No edema.      Left lower leg: No edema.   Lymphadenopathy:      Cervical: No cervical adenopathy.     Skin:     General: Skin is warm and dry.     Neurological:      General: No focal deficit present.      Mental Status: She is alert and oriented to person, place, and time.     Psychiatric:         Mood and Affect: Mood normal.         Behavior: Behavior normal.         Thought Content: Thought content normal.         Judgment: Judgment normal.

## 2025-06-10 NOTE — ASSESSMENT & PLAN NOTE
Diabetes remains uncontrolled with recent hemoglobin A1c at 8.0.  Discussed treatment options and patient agrees to start metformin 500 mg 1 daily with food for 1 to 2 weeks then increased to 1 twice daily with food.  Patient is being referred to clinical pharmacist to help manage treatment.  Discussed referral to dietitian and endocrinologist.  Patient to follow a low sugar and low carbohydrate diet more carefully and get regular aerobic exercise 150 minutes/week.  Weight loss encouraged.  Discussed losing 10% of body weight or approximately 21 pounds over the next 3 to 6 months.  Return to the office in 3 months.  Lab Results   Component Value Date    HGBA1C 8.0 (H) 05/31/2025       Orders:    Ambulatory referral to clinical pharmacy; Future    metFORMIN (GLUCOPHAGE) 500 mg tablet; Take 1 tablet (500 mg total) by mouth 2 (two) times a day with meals

## 2025-06-12 ENCOUNTER — TELEPHONE (OUTPATIENT)
Dept: FAMILY MEDICINE CLINIC | Facility: CLINIC | Age: 55
End: 2025-06-12

## 2025-06-12 NOTE — TELEPHONE ENCOUNTER
Please contact patient to schedule Clinical Pharmacist Appointment. This encounter will be used to track patient outreaches.     Patient will be contacted on 2 separate days before pharmacy referral is canceled.    Reason for appointment: diabetes management   When to schedule with Pharmacist: anytime  What should the patient bring to the appointment: blood sugar readings    Appointment Department: [facility]  Pharmacist patient will be seeing: Salome Arce  Visit Type Preference (ie Phone, Video, In-person): either based on patient preference  In-person visits will be at: [facility]  Virtual visits will be completed via Video or Phone - please clarify patient preference in appointment notes. If left unspecified, it will be assumed the appointment will be completed via Epic Embedded platform

## 2025-06-19 NOTE — TELEPHONE ENCOUNTER
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

## 2025-07-01 ENCOUNTER — TELEPHONE (OUTPATIENT)
Dept: FAMILY MEDICINE CLINIC | Facility: CLINIC | Age: 55
End: 2025-07-01

## 2025-07-01 ENCOUNTER — TELEMEDICINE (OUTPATIENT)
Dept: FAMILY MEDICINE CLINIC | Facility: CLINIC | Age: 55
End: 2025-07-01

## 2025-07-01 DIAGNOSIS — E11.9 TYPE 2 DIABETES MELLITUS WITHOUT COMPLICATION, WITHOUT LONG-TERM CURRENT USE OF INSULIN (HCC): ICD-10-CM

## 2025-07-01 DIAGNOSIS — J31.0 CHRONIC RHINITIS: ICD-10-CM

## 2025-07-01 PROCEDURE — PBNCHG PB NO CHARGE PLACEHOLDER: Performed by: PHARMACIST

## 2025-07-01 RX ORDER — TIRZEPATIDE 2.5 MG/.5ML
2.5 INJECTION, SOLUTION SUBCUTANEOUS WEEKLY
Qty: 2 ML | Refills: 0 | Status: SHIPPED | OUTPATIENT
Start: 2025-07-01

## 2025-07-01 RX ORDER — TIRZEPATIDE 5 MG/.5ML
5 INJECTION, SOLUTION SUBCUTANEOUS WEEKLY
Qty: 2 ML | Refills: 0 | Status: SHIPPED | OUTPATIENT
Start: 2025-07-22

## 2025-07-01 RX ORDER — UREA 40 %
1 CREAM (GRAM) TOPICAL 2 TIMES DAILY
COMMUNITY

## 2025-07-01 NOTE — ASSESSMENT & PLAN NOTE
Lab Results   Component Value Date    HGBA1C 8.0 (H) 05/31/2025     Most recent A1c above goal but not reflective of current treatment as patient was not on medication.   No home readings to review.   Would benefit from GLP1      Medications:  Metformin: continue for now  Mounjaro: patient to start titration. Reviewed dosing, potential adverse drug reaction, storage, admin.   Home Monitoring: reviewed [email or web address] services, message sent with website    Orders:    Ambulatory referral to clinical pharmacy    Tirzepatide (Mounjaro) 2.5 MG/0.5ML SOAJ; Inject 2.5 mg under the skin once a week    Tirzepatide (Mounjaro) 5 MG/0.5ML SOAJ; Inject 5 mg under the skin once a week Do not start before July 22, 2025.

## 2025-07-01 NOTE — PROGRESS NOTES
[facility]  Salome Arce    Administrative Statements   Encounter provider Salome Arce    The Patient is located at Home and in the following state in which I hold an active license [place].    The patient was identified by name and date of birth. Marta Dalton was informed that this is a telemedicine visit and that the visit is being conducted through the Epic Embedded platform. She agrees to proceed..  My office door was closed. No one else was in the room.  She acknowledged consent and understanding of privacy and security of the video platform. The patient has agreed to participate and understands they can discontinue the visit at any time.    Assessment/ Plan     Assessment & Plan  Type 2 diabetes mellitus without complication, without long-term current use of insulin ([facility])    Lab Results   Component Value Date    HGBA1C 8.0 (H) 05/31/2025     Most recent A1c above goal but not reflective of current treatment as patient was not on medication.   No home readings to review.   Would benefit from GLP1      Medications:  Metformin: continue for now  Mounjaro: patient to start titration. Reviewed dosing, potential adverse drug reaction, storage, admin.   Home Monitoring: reviewed [employer] services, message sent with website    Orders:    Ambulatory referral to clinical pharmacy    Tirzepatide (Mounjaro) 2.5 MG/0.5ML SOAJ; Inject 2.5 mg under the skin once a week    Tirzepatide (Mounjaro) 5 MG/0.5ML SOAJ; Inject 5 mg under the skin once a week Do not start before July 22, 2025.      Follow-up: 2 months virtual     Subjective   Diabetes  She presents for her initial diabetic visit. She has type 2 diabetes mellitus. The initial diagnosis of diabetes was made 2 years ago. There are no hypoglycemic associated symptoms. Pertinent negatives for diabetes include no blurred vision, no polydipsia, no polyphagia, no polyuria, no visual change and no weight loss. There  "are no hypoglycemic complications. Symptoms are stable. There are no diabetic complications. Current diabetic treatment includes oral agent (monotherapy). She is compliant with treatment all of the time.       Medication Adherence/ Tolerability/ Cost:  metFORMIN: 1 tablet once daily, will be increasing to 1 tablet twice daily tomorrow    Review of Systems   Constitutional:  Negative for weight loss.   Eyes:  Negative for blurred vision.   Endocrine: Negative for polydipsia, polyphagia and polyuria.      2. Lifestyle:   Central scheduler for [employer]  Sister and brother have T2DM, they are on Ozempic and Mounjaro and tolerating well.  Goal weight: 162 pounds     3. Home monitoring devices  Glucometer: No, Brand: NA  Continuous Glucose Monitor: No, Brand: NA     Objective     ASCVD Risk:  The 10-year ASCVD risk score (Josette HELM, et al., 2019) is: 3.7%    Values used to calculate the score:      Age: 54 years      Clincally relevant sex: Female      Is Non- : No      Diabetic: Yes      Tobacco smoker: No      Systolic Blood Pressure: 136 mmHg      Is BP treated: No      HDL Cholesterol: 59 mg/dL      Total Cholesterol: 183 mg/dL     Vitals:  There were no vitals filed for this visit.    Eye Exam:    No results found for: \"LEFTDIABRET\", \"RIGHTDIABRET\"    Labs:  Lab Results   Component Value Date    SODIUM 139 03/07/2025    K 4.9 03/07/2025    EGFR 70 03/07/2025    CREATININE 0.92 03/07/2025    GLUF 149 (H) 03/07/2025    MICROALBCRE  03/07/2025      Comment:      Unable to calculate.       Lab Results   Component Value Date    HGBA1C 8.0 (H) 05/31/2025    HGBA1C 7.9 (H) 03/07/2025    HGBA1C 7.1 (A) 09/05/2024     Pharmacist Tracking Tool     Pharmacist Tracking Tool  Reason For Outreach: Embedded Pharmacist  Demographics:  Intervention Method: Video  Type of Intervention: New  Topics Addressed: Diabetes  Pharmacologic Interventions: Medication Initiation  Non-Pharmacologic Interventions: Disease " state education, Home Monitoring, and Medication/Device education  Time:  Direct Patient Care: 30 mins  Care Coordination: 10 mins  Recommendation Recipient: Patient/Caregiver  Outcome: Accepted

## 2025-07-02 RX ORDER — FLUTICASONE PROPIONATE 50 MCG
2 SPRAY, SUSPENSION (ML) NASAL DAILY
Qty: 48 G | Refills: 1 | Status: SHIPPED | OUTPATIENT
Start: 2025-07-02

## 2025-07-02 NOTE — TELEPHONE ENCOUNTER
PA for Mounjaro 2.5MG/0.5ML APPROVED     Date(s) approved 07/02/2025-07/02/2026    Case #[id number]     Patient advised by          [x]MyChart Message  []Phone call   []LMOM  []L/M to call office as no active Communication consent on file  [x]Unable to leave detailed message as VM not approved on Communication consent       Pharmacy advised by    [x]Fax  []Phone call    Approval letter scanned into Media No waiting for letter

## 2025-07-02 NOTE — TELEPHONE ENCOUNTER
PA for Mounjaro 2.5MG/0.5ML SUBMITTED to [employer]    via    []CMM-KEY:   [x]Surescripts-Case ID # [id number]   []Availity-Auth ID # NDC #   []Faxed to plan   []Other website   []Phone call Case ID #     [x]PA sent as URGENT    All office notes, labs and other pertaining documents and studies sent. Clinical questions answered. Awaiting determination from insurance company.     Turnaround time for your insurance to make a decision on your Prior Authorization can take 7-21 business days.

## 2025-07-22 DIAGNOSIS — R11.2 NAUSEA AND VOMITING, UNSPECIFIED VOMITING TYPE: Primary | ICD-10-CM

## 2025-07-22 RX ORDER — ONDANSETRON 4 MG/1
4 TABLET, FILM COATED ORAL EVERY 8 HOURS PRN
Qty: 10 TABLET | Refills: 0 | Status: SHIPPED | OUTPATIENT
Start: 2025-07-22

## 2025-07-22 NOTE — TELEPHONE ENCOUNTER
Message from patient. Experienced some nausea since starting Mounjaro but has been getting better. Did have vomiting today, however this was first episode. Has taken x 2 doses of Mounjaro 2.5mg dose. Patient aware to contact [facility] directly to have them fill 5mg pens for next fill.     Given vomiting, will ask PCP to send temporary ondansetron supply to the pharmacy to aid patient during Mounjaro titration.     Patient aware to contact PCP if any issues in the interim as PharmD will be OOO.

## 2025-07-23 ENCOUNTER — PATIENT MESSAGE (OUTPATIENT)
Dept: FAMILY MEDICINE CLINIC | Facility: CLINIC | Age: 55
End: 2025-07-23

## 2025-07-23 DIAGNOSIS — E11.9 TYPE 2 DIABETES MELLITUS WITHOUT COMPLICATION, WITHOUT LONG-TERM CURRENT USE OF INSULIN (HCC): ICD-10-CM

## 2025-07-23 RX ORDER — TIRZEPATIDE 2.5 MG/.5ML
2.5 INJECTION, SOLUTION SUBCUTANEOUS WEEKLY
Qty: 2 ML | Refills: 0 | Status: SHIPPED | OUTPATIENT
Start: 2025-07-23

## 2025-07-23 RX ORDER — TIRZEPATIDE 2.5 MG/.5ML
2.5 INJECTION, SOLUTION SUBCUTANEOUS WEEKLY
Qty: 2 ML | Refills: 0 | Status: SHIPPED | OUTPATIENT
Start: 2025-07-23 | End: 2025-07-23 | Stop reason: SDUPTHER

## 2025-07-23 NOTE — PATIENT COMMUNICATION
Called patient to review. Will continue 2.5mg for another 2 weeks then will see in 2 weeks if we need to continue vs increase dose based on nausea severity.

## 2025-08-04 ENCOUNTER — PATIENT MESSAGE (OUTPATIENT)
Dept: FAMILY MEDICINE CLINIC | Facility: CLINIC | Age: 55
End: 2025-08-04

## 2025-08-04 DIAGNOSIS — E11.9 TYPE 2 DIABETES MELLITUS WITHOUT COMPLICATION, WITHOUT LONG-TERM CURRENT USE OF INSULIN (HCC): Primary | ICD-10-CM

## 2025-08-04 RX ORDER — TIRZEPATIDE 5 MG/.5ML
5 INJECTION, SOLUTION SUBCUTANEOUS WEEKLY
Qty: 2 ML | Refills: 1 | Status: SHIPPED | OUTPATIENT
Start: 2025-08-04 | End: 2025-08-15 | Stop reason: DRUGHIGH

## 2025-08-14 ENCOUNTER — TELEPHONE (OUTPATIENT)
Age: 55
End: 2025-08-14

## 2025-08-14 ENCOUNTER — TELEPHONE (OUTPATIENT)
Dept: FAMILY MEDICINE CLINIC | Facility: CLINIC | Age: 55
End: 2025-08-14

## 2025-08-15 ENCOUNTER — TELEPHONE (OUTPATIENT)
Dept: FAMILY MEDICINE CLINIC | Facility: CLINIC | Age: 55
End: 2025-08-15